# Patient Record
Sex: FEMALE | Race: WHITE | NOT HISPANIC OR LATINO | URBAN - METROPOLITAN AREA
[De-identification: names, ages, dates, MRNs, and addresses within clinical notes are randomized per-mention and may not be internally consistent; named-entity substitution may affect disease eponyms.]

---

## 2017-01-09 PROBLEM — Z00.00 ENCOUNTER FOR PREVENTIVE HEALTH EXAMINATION: Status: ACTIVE | Noted: 2017-01-09

## 2017-01-24 ENCOUNTER — OUTPATIENT (OUTPATIENT)
Dept: OUTPATIENT SERVICES | Facility: HOSPITAL | Age: 52
LOS: 1 days | End: 2017-01-24
Payer: COMMERCIAL

## 2017-01-24 ENCOUNTER — APPOINTMENT (OUTPATIENT)
Dept: ORTHOPEDIC SURGERY | Facility: CLINIC | Age: 52
End: 2017-01-24

## 2017-01-24 VITALS — BODY MASS INDEX: 30.18 KG/M2 | HEIGHT: 62 IN | WEIGHT: 164 LBS

## 2017-01-24 VITALS — SYSTOLIC BLOOD PRESSURE: 110 MMHG | DIASTOLIC BLOOD PRESSURE: 60 MMHG

## 2017-01-24 DIAGNOSIS — Z86.69 PERSONAL HISTORY OF OTHER DISEASES OF THE NERVOUS SYSTEM AND SENSE ORGANS: ICD-10-CM

## 2017-01-24 DIAGNOSIS — M54.41 LUMBAGO WITH SCIATICA, RIGHT SIDE: ICD-10-CM

## 2017-01-24 DIAGNOSIS — Z78.9 OTHER SPECIFIED HEALTH STATUS: ICD-10-CM

## 2017-01-24 DIAGNOSIS — G89.29 LUMBAGO WITH SCIATICA, RIGHT SIDE: ICD-10-CM

## 2017-01-24 DIAGNOSIS — Z82.62 FAMILY HISTORY OF OSTEOPOROSIS: ICD-10-CM

## 2017-01-24 DIAGNOSIS — M43.16 SPONDYLOLISTHESIS, LUMBAR REGION: ICD-10-CM

## 2017-01-24 DIAGNOSIS — Z87.39 PERSONAL HISTORY OF OTHER DISEASES OF THE MUSCULOSKELETAL SYSTEM AND CONNECTIVE TISSUE: ICD-10-CM

## 2017-01-24 DIAGNOSIS — Z86.39 PERSONAL HISTORY OF OTHER ENDOCRINE, NUTRITIONAL AND METABOLIC DISEASE: ICD-10-CM

## 2017-01-24 PROCEDURE — 72100 X-RAY EXAM L-S SPINE 2/3 VWS: CPT

## 2017-01-24 PROCEDURE — 72082 X-RAY EXAM ENTIRE SPI 2/3 VW: CPT | Mod: 26

## 2017-01-24 PROCEDURE — 72100 X-RAY EXAM L-S SPINE 2/3 VWS: CPT | Mod: 26,59

## 2017-01-24 PROCEDURE — 72082 X-RAY EXAM ENTIRE SPI 2/3 VW: CPT

## 2017-01-24 RX ORDER — ATORVASTATIN CALCIUM 80 MG/1
TABLET, FILM COATED ORAL
Refills: 0 | Status: ACTIVE | COMMUNITY

## 2017-01-30 PROBLEM — Z78.9 ALCOHOL INGESTION: Status: ACTIVE | Noted: 2017-01-30

## 2017-06-20 ENCOUNTER — RESULT REVIEW (OUTPATIENT)
Age: 52
End: 2017-06-20

## 2018-05-03 ENCOUNTER — TRANSCRIPTION ENCOUNTER (OUTPATIENT)
Age: 53
End: 2018-05-03

## 2018-07-16 ENCOUNTER — RESULT REVIEW (OUTPATIENT)
Age: 53
End: 2018-07-16

## 2018-08-14 ENCOUNTER — APPOINTMENT (OUTPATIENT)
Dept: BREAST CENTER | Facility: CLINIC | Age: 53
End: 2018-08-14
Payer: COMMERCIAL

## 2018-08-14 VITALS
HEART RATE: 60 BPM | DIASTOLIC BLOOD PRESSURE: 81 MMHG | HEIGHT: 62 IN | WEIGHT: 162 LBS | BODY MASS INDEX: 29.81 KG/M2 | SYSTOLIC BLOOD PRESSURE: 133 MMHG

## 2018-08-14 DIAGNOSIS — Z80.9 FAMILY HISTORY OF MALIGNANT NEOPLASM, UNSPECIFIED: ICD-10-CM

## 2018-08-14 PROCEDURE — 99214 OFFICE O/P EST MOD 30 MIN: CPT

## 2018-08-14 RX ORDER — BACILLUS COAGULANS/INULIN 1B-250 MG
CAPSULE ORAL
Refills: 0 | Status: ACTIVE | COMMUNITY

## 2019-01-15 ENCOUNTER — RX RENEWAL (OUTPATIENT)
Age: 54
End: 2019-01-15

## 2019-01-15 DIAGNOSIS — F41.9 ANXIETY DISORDER, UNSPECIFIED: ICD-10-CM

## 2019-02-26 ENCOUNTER — APPOINTMENT (OUTPATIENT)
Dept: BREAST CENTER | Facility: CLINIC | Age: 54
End: 2019-02-26
Payer: COMMERCIAL

## 2019-02-26 VITALS
DIASTOLIC BLOOD PRESSURE: 77 MMHG | SYSTOLIC BLOOD PRESSURE: 134 MMHG | HEIGHT: 62 IN | BODY MASS INDEX: 29.26 KG/M2 | HEART RATE: 81 BPM | WEIGHT: 159 LBS

## 2019-02-26 DIAGNOSIS — Z82.5 FAMILY HISTORY OF ASTHMA AND OTHER CHRONIC LOWER RESPIRATORY DISEASES: ICD-10-CM

## 2019-02-26 DIAGNOSIS — Z86.79 PERSONAL HISTORY OF OTHER DISEASES OF THE CIRCULATORY SYSTEM: ICD-10-CM

## 2019-02-26 DIAGNOSIS — Z87.19 PERSONAL HISTORY OF OTHER DISEASES OF THE DIGESTIVE SYSTEM: ICD-10-CM

## 2019-02-26 PROCEDURE — 99214 OFFICE O/P EST MOD 30 MIN: CPT

## 2019-02-26 RX ORDER — LEVOTHYROXINE SODIUM 137 UG/1
TABLET ORAL
Refills: 0 | Status: ACTIVE | COMMUNITY

## 2019-02-26 NOTE — PHYSICAL EXAM
[Normocephalic] : normocephalic [Atraumatic] : atraumatic [Supple] : supple [No Supraclavicular Adenopathy] : no supraclavicular adenopathy [No Cervical Adenopathy] : no cervical adenopathy [Normal Sinus Rhythm] : normal sinus rhythm [Examined in the supine and seated position] : examined in the supine and seated position [No dominant masses] : no dominant masses in right breast  [No dominant masses] : no dominant masses left breast [No Nipple Retraction] : no left nipple retraction [No Nipple Discharge] : no left nipple discharge [No Axillary Lymphadenopathy] : no left axillary lymphadenopathy [No Edema] : no edema [No Rashes] : no rashes [No Ulceration] : no ulceration [de-identified] : +marked diffuse thyromegaly > old

## 2019-04-10 ENCOUNTER — APPOINTMENT (OUTPATIENT)
Dept: PLASTIC SURGERY | Facility: CLINIC | Age: 54
End: 2019-04-10
Payer: COMMERCIAL

## 2019-04-10 PROCEDURE — 99201 OFFICE OUTPATIENT NEW 10 MINUTES: CPT | Mod: NC

## 2019-04-30 ENCOUNTER — APPOINTMENT (OUTPATIENT)
Dept: PLASTIC SURGERY | Facility: CLINIC | Age: 54
End: 2019-04-30
Payer: SELF-PAY

## 2019-04-30 PROCEDURE — 17999 UNLISTD PX SKN MUC MEMB SUBQ: CPT

## 2019-05-20 ENCOUNTER — TRANSCRIPTION ENCOUNTER (OUTPATIENT)
Age: 54
End: 2019-05-20

## 2019-08-02 ENCOUNTER — APPOINTMENT (OUTPATIENT)
Dept: PLASTIC SURGERY | Facility: CLINIC | Age: 54
End: 2019-08-02

## 2019-09-17 ENCOUNTER — APPOINTMENT (OUTPATIENT)
Dept: BREAST CENTER | Facility: CLINIC | Age: 54
End: 2019-09-17
Payer: COMMERCIAL

## 2019-09-17 VITALS
WEIGHT: 158 LBS | DIASTOLIC BLOOD PRESSURE: 78 MMHG | HEIGHT: 62 IN | SYSTOLIC BLOOD PRESSURE: 125 MMHG | BODY MASS INDEX: 29.08 KG/M2 | HEART RATE: 62 BPM

## 2019-09-17 PROCEDURE — 99214 OFFICE O/P EST MOD 30 MIN: CPT

## 2019-09-17 RX ORDER — LISINOPRIL 30 MG/1
TABLET ORAL
Refills: 0 | Status: DISCONTINUED | COMMUNITY
End: 2019-09-17

## 2019-09-17 RX ORDER — ELETRIPTAN HYDROBROMIDE 20 MG/1
20 TABLET, FILM COATED ORAL
Refills: 0 | Status: DISCONTINUED | COMMUNITY
End: 2019-09-17

## 2019-09-17 NOTE — PHYSICAL EXAM
[Normocephalic] : normocephalic [Atraumatic] : atraumatic [Supple] : supple [No Supraclavicular Adenopathy] : no supraclavicular adenopathy [No Cervical Adenopathy] : no cervical adenopathy [Normal Sinus Rhythm] : normal sinus rhythm [No Thyromegaly] : no thyromegaly [Examined in the supine and seated position] : examined in the supine and seated position [No dominant masses] : no dominant masses in right breast  [No dominant masses] : no dominant masses left breast [No Nipple Retraction] : no left nipple retraction [No Nipple Discharge] : no left nipple discharge [No Axillary Lymphadenopathy] : no left axillary lymphadenopathy [No Rashes] : no rashes [No Edema] : no edema [No Ulceration] : no ulceration

## 2019-09-17 NOTE — REVIEW OF SYSTEMS
[Recent Weight Loss (___ Lbs)] : recent [unfilled] ~Ulb weight loss [Heartburn] : heartburn [Constipation] : constipation [Negative] : Heme/Lymph

## 2019-09-17 NOTE — HISTORY OF PRESENT ILLNESS
[FreeTextEntry1] : S/P R Stereot Bx (7/23/09): +LCIS/ALH\par S/P R BR Bx (1997)([facility]): FA\par S/P R Br Bx w/NL (9/11/09): +ALH\par S/P tmx/ASAb x 5yrs\par +FH Br Ca (Mother 67 (BRCA-))\par +Ashkenazi ancestry\par BRCA (11/24/09): -\par S/P lap BSO (12/13)([facility]): Benign cyst\par No other MH/FH changes. ROS reviewed/discussed. Taking Ca/Vit D. Last Bone Densitometry (5/19/14): +osteopenia\par MRI (2/4/19): NSF\par Mammo/Sono (8/13/19): HD, NSF

## 2019-09-21 ENCOUNTER — TRANSCRIPTION ENCOUNTER (OUTPATIENT)
Age: 54
End: 2019-09-21

## 2019-10-16 ENCOUNTER — APPOINTMENT (OUTPATIENT)
Dept: PLASTIC SURGERY | Facility: CLINIC | Age: 54
End: 2019-10-16
Payer: SELF-PAY

## 2019-10-16 PROCEDURE — 17999 UNLISTD PX SKN MUC MEMB SUBQ: CPT | Mod: 78

## 2019-11-11 ENCOUNTER — RESULT REVIEW (OUTPATIENT)
Age: 54
End: 2019-11-11

## 2019-11-18 ENCOUNTER — APPOINTMENT (OUTPATIENT)
Dept: PLASTIC SURGERY | Facility: CLINIC | Age: 54
End: 2019-11-18
Payer: SELF-PAY

## 2019-11-18 PROCEDURE — 17999 UNLISTD PX SKN MUC MEMB SUBQ: CPT | Mod: 78

## 2019-12-30 ENCOUNTER — APPOINTMENT (OUTPATIENT)
Dept: PLASTIC SURGERY | Facility: CLINIC | Age: 54
End: 2019-12-30
Payer: SELF-PAY

## 2019-12-30 PROCEDURE — 17999 UNLISTD PX SKN MUC MEMB SUBQ: CPT | Mod: 78

## 2020-01-23 ENCOUNTER — TRANSCRIPTION ENCOUNTER (OUTPATIENT)
Age: 55
End: 2020-01-23

## 2020-01-24 ENCOUNTER — APPOINTMENT (OUTPATIENT)
Dept: PAIN MANAGEMENT | Facility: CLINIC | Age: 55
End: 2020-01-24
Payer: COMMERCIAL

## 2020-01-24 VITALS
BODY MASS INDEX: 27.97 KG/M2 | HEART RATE: 70 BPM | HEIGHT: 62 IN | WEIGHT: 152 LBS | SYSTOLIC BLOOD PRESSURE: 104 MMHG | DIASTOLIC BLOOD PRESSURE: 64 MMHG

## 2020-01-24 PROCEDURE — 99204 OFFICE O/P NEW MOD 45 MIN: CPT

## 2020-01-24 NOTE — PHYSICAL EXAM
[Person] : oriented to person [General Appearance - Alert] : alert [Place] : oriented to place [Time] : oriented to time [Sclera] : the sclera and conjunctiva were normal [Outer Ear] : the ears and nose were normal in appearance [Neck Appearance] : the appearance of the neck was normal [Nail Clubbing] : no clubbing  or cyanosis of the fingernails [] : no rash [Cranial Nerves Oculomotor (III)] : extraocular motion intact [Cranial Nerves Facial (VII)] : face symmetrical [Affect] : the affect was normal

## 2020-01-29 NOTE — HISTORY OF PRESENT ILLNESS
[FreeTextEntry1] : Patient reports headaches since adolescence not related to her menses at an intermittent frequency. She has been on Aimovig 70 mgs;140 mgs (made her constipated). for about one yearOver the past 3 months - often awaken with left side headache - ignore it but takes Advil / tylenol almost qd especially for back. Overall, frequency of headaches 50 % but some times no headaches. In general 4 tyl/advil 4 times per week \par Imitrex, Relpax caused angina\par In 2003, she fell and since then has noted left sided headaches. \par \par Surgical menopause age 48\par Dad has migraines

## 2020-01-29 NOTE — ASSESSMENT
[FreeTextEntry1] : Chronic migraine\par Non focal exam\par \par Consider BOTOX - Patient requests Emgality.\par Demonstrated kit shown\par Ubrelvy provided. Advised of AE.\par

## 2020-01-31 RX ORDER — ERENUMAB-AOOE 70 MG/ML
70 INJECTION SUBCUTANEOUS
Refills: 0 | Status: DISCONTINUED | COMMUNITY
End: 2020-01-31

## 2020-02-04 ENCOUNTER — APPOINTMENT (OUTPATIENT)
Dept: PLASTIC SURGERY | Facility: CLINIC | Age: 55
End: 2020-02-04
Payer: SELF-PAY

## 2020-02-04 PROCEDURE — 17999 UNLISTD PX SKN MUC MEMB SUBQ: CPT | Mod: 78

## 2020-02-05 ENCOUNTER — APPOINTMENT (OUTPATIENT)
Dept: PLASTIC SURGERY | Facility: CLINIC | Age: 55
End: 2020-02-05
Payer: SELF-PAY

## 2020-02-05 PROCEDURE — 11950 SUBQ NJX FILLING MATRL 1CC/<: CPT

## 2020-02-05 NOTE — ASSESSMENT
[FreeTextEntry1] : A:\par prominent glabellar frown lines\par P:\par Botox 20 units to glabellar frown lines\par well tolerated \par instructions reviewed

## 2020-02-05 NOTE — PROCEDURE
[FreeTextEntry1] : prominent glabellar frown lines [FreeTextEntry2] : Botox to glabellar frown lines  [FreeTextEntry3] : Topical Quadricaine  [FreeTextEntry4] : minimal  [FreeTextEntry5] : none  [FreeTextEntry6] : Following pre treatment with Quadricaine, 20 units Botox injected in the glabellar frown lines.  \par Patient tolerated well [FreeTextEntry7] : none

## 2020-02-05 NOTE — REASON FOR VISIT
[Procedure: _________] : a [unfilled] procedure visit [FreeTextEntry1] : Patient presents for treatment of prominent glabellar frown lines with Botox

## 2020-02-06 ENCOUNTER — TRANSCRIPTION ENCOUNTER (OUTPATIENT)
Age: 55
End: 2020-02-06

## 2020-02-12 ENCOUNTER — TRANSCRIPTION ENCOUNTER (OUTPATIENT)
Age: 55
End: 2020-02-12

## 2020-02-14 ENCOUNTER — TRANSCRIPTION ENCOUNTER (OUTPATIENT)
Age: 55
End: 2020-02-14

## 2020-03-23 ENCOUNTER — APPOINTMENT (OUTPATIENT)
Dept: BREAST CENTER | Facility: CLINIC | Age: 55
End: 2020-03-23

## 2020-03-30 ENCOUNTER — APPOINTMENT (OUTPATIENT)
Dept: PLASTIC SURGERY | Facility: CLINIC | Age: 55
End: 2020-03-30

## 2020-04-26 ENCOUNTER — MESSAGE (OUTPATIENT)
Age: 55
End: 2020-04-26

## 2020-05-01 ENCOUNTER — APPOINTMENT (OUTPATIENT)
Dept: DISASTER EMERGENCY | Facility: HOSPITAL | Age: 55
End: 2020-05-01

## 2020-05-03 LAB
SARS-COV-2 IGG SERPL IA-ACNC: 9.5 RATIO
SARS-COV-2 IGG SERPL QL IA: POSITIVE

## 2020-05-04 ENCOUNTER — APPOINTMENT (OUTPATIENT)
Dept: PAIN MANAGEMENT | Facility: CLINIC | Age: 55
End: 2020-05-04

## 2020-05-06 ENCOUNTER — TRANSCRIPTION ENCOUNTER (OUTPATIENT)
Age: 55
End: 2020-05-06

## 2020-05-21 ENCOUNTER — RX RENEWAL (OUTPATIENT)
Age: 55
End: 2020-05-21

## 2020-05-27 ENCOUNTER — TRANSCRIPTION ENCOUNTER (OUTPATIENT)
Age: 55
End: 2020-05-27

## 2020-06-09 ENCOUNTER — APPOINTMENT (OUTPATIENT)
Dept: BREAST CENTER | Facility: CLINIC | Age: 55
End: 2020-06-09
Payer: COMMERCIAL

## 2020-06-09 VITALS
SYSTOLIC BLOOD PRESSURE: 112 MMHG | WEIGHT: 147 LBS | BODY MASS INDEX: 27.05 KG/M2 | HEIGHT: 62 IN | DIASTOLIC BLOOD PRESSURE: 75 MMHG | HEART RATE: 86 BPM

## 2020-06-09 PROCEDURE — 99214 OFFICE O/P EST MOD 30 MIN: CPT

## 2020-06-09 RX ORDER — BUTALB/ACETAMINOPHEN/CAFFEINE 50-325-40
TABLET ORAL
Refills: 0 | Status: DISCONTINUED | COMMUNITY
End: 2020-06-09

## 2020-06-09 RX ORDER — GALCANEZUMAB 120 MG/ML
120 INJECTION, SOLUTION SUBCUTANEOUS
Qty: 2 | Refills: 0 | Status: DISCONTINUED | COMMUNITY
Start: 2020-01-31 | End: 2020-06-09

## 2020-06-09 RX ORDER — RANITIDINE HYDROCHLORIDE 300 MG/1
TABLET, FILM COATED ORAL
Refills: 0 | Status: DISCONTINUED | COMMUNITY
End: 2020-06-09

## 2020-06-09 NOTE — PHYSICAL EXAM
[Normocephalic] : normocephalic [Atraumatic] : atraumatic [Supple] : supple [No Supraclavicular Adenopathy] : no supraclavicular adenopathy [No Cervical Adenopathy] : no cervical adenopathy [Normal Sinus Rhythm] : normal sinus rhythm [Examined in the supine and seated position] : examined in the supine and seated position [No dominant masses] : no dominant masses in right breast  [No dominant masses] : no dominant masses left breast [No Nipple Retraction] : no left nipple retraction [No Nipple Discharge] : no left nipple discharge [No Axillary Lymphadenopathy] : no left axillary lymphadenopathy [No Edema] : no edema [No Rashes] : no rashes [No Ulceration] : no ulceration [de-identified] : +stable, diffusely enlarged gland

## 2020-06-12 ENCOUNTER — APPOINTMENT (OUTPATIENT)
Dept: PAIN MANAGEMENT | Facility: CLINIC | Age: 55
End: 2020-06-12
Payer: COMMERCIAL

## 2020-06-12 PROCEDURE — 99213 OFFICE O/P EST LOW 20 MIN: CPT | Mod: 95

## 2020-06-12 NOTE — REASON FOR VISIT
[Home] : at home, [unfilled] , at the time of the visit. [Medical Office: ([facility])___] : at the medical office located in  [Verbal consent obtained from patient] : the patient, [unfilled]

## 2020-06-12 NOTE — PHYSICAL EXAM
[General Appearance - Alert] : alert [Person] : oriented to person [Affect] : the affect was normal [Place] : oriented to place [Time] : oriented to time [Cranial Nerves Oculomotor (III)] : extraocular motion intact [Cranial Nerves Facial (VII)] : face symmetrical [Sclera] : the sclera and conjunctiva were normal [Outer Ear] : the ears and nose were normal in appearance [Neck Appearance] : the appearance of the neck was normal [] : no rash [Nail Clubbing] : no clubbing  or cyanosis of the fingernails

## 2020-06-14 NOTE — ASSESSMENT
[FreeTextEntry1] : Chronic migraine\par Non focal exam\par \par Will switch to Nurtec - advised of AEs \par Continue Emgality\par

## 2020-06-14 NOTE — HISTORY OF PRESENT ILLNESS
[FreeTextEntry1] : Patient reports Emgality helps to decrease freq and intensity of headache and Ubrelvy has somewhat worked. She reports being dx COVID and admitted to hospital. She reports a lack of smell since COVID. Since having COVID has had more headaches. Working.

## 2020-06-24 ENCOUNTER — TRANSCRIPTION ENCOUNTER (OUTPATIENT)
Age: 55
End: 2020-06-24

## 2020-06-25 ENCOUNTER — TRANSCRIPTION ENCOUNTER (OUTPATIENT)
Age: 55
End: 2020-06-25

## 2020-06-26 ENCOUNTER — TRANSCRIPTION ENCOUNTER (OUTPATIENT)
Age: 55
End: 2020-06-26

## 2020-06-29 ENCOUNTER — TRANSCRIPTION ENCOUNTER (OUTPATIENT)
Age: 55
End: 2020-06-29

## 2020-06-30 ENCOUNTER — TRANSCRIPTION ENCOUNTER (OUTPATIENT)
Age: 55
End: 2020-06-30

## 2020-07-15 ENCOUNTER — APPOINTMENT (OUTPATIENT)
Dept: PLASTIC SURGERY | Facility: CLINIC | Age: 55
End: 2020-07-15
Payer: SELF-PAY

## 2020-07-15 PROCEDURE — 17999 UNLISTD PX SKN MUC MEMB SUBQ: CPT

## 2020-07-15 NOTE — ASSESSMENT
[FreeTextEntry1] : A:\par Prominent glabellar frown lines\par P:\par Botox 20 units to Glabellar frown lines\par tolerated well\par Instructions reviewed

## 2020-07-15 NOTE — REASON FOR VISIT
[Procedure: _________] : a [unfilled] procedure visit [FreeTextEntry1] : Patient returns for treatment of glabellar frown lines with Botox

## 2020-07-15 NOTE — PROCEDURE
[FreeTextEntry1] : Prominent glabellar frown lines  [FreeTextEntry2] : Botox to glabellar frown lines  [FreeTextEntry3] : Topical Quadricaine [FreeTextEntry4] : minimal  [FreeTextEntry7] : none  [FreeTextEntry6] : FOllowing pre treatment with Quadricaine, 20 units Botox injected in the glabellar frown lines.  \par Patient tolerated well  [FreeTextEntry5] : none

## 2020-08-28 ENCOUNTER — APPOINTMENT (OUTPATIENT)
Dept: PLASTIC SURGERY | Facility: CLINIC | Age: 55
End: 2020-08-28
Payer: SELF-PAY

## 2020-08-28 PROCEDURE — 99212 OFFICE O/P EST SF 10 MIN: CPT | Mod: NC

## 2020-09-02 ENCOUNTER — APPOINTMENT (OUTPATIENT)
Dept: NEUROLOGY | Facility: CLINIC | Age: 55
End: 2020-09-02
Payer: COMMERCIAL

## 2020-09-02 VITALS
HEIGHT: 62 IN | BODY MASS INDEX: 27.42 KG/M2 | SYSTOLIC BLOOD PRESSURE: 117 MMHG | TEMPERATURE: 96.6 F | DIASTOLIC BLOOD PRESSURE: 74 MMHG | WEIGHT: 149 LBS | HEART RATE: 71 BPM

## 2020-09-02 DIAGNOSIS — R43.0 ANOSMIA: ICD-10-CM

## 2020-09-02 PROCEDURE — 99214 OFFICE O/P EST MOD 30 MIN: CPT

## 2020-09-02 PROCEDURE — 99204 OFFICE O/P NEW MOD 45 MIN: CPT

## 2020-09-02 NOTE — PHYSICAL EXAM
[FreeTextEntry1] : Physical examination \par General: No acute distress, Awake, Alert. \par Fundus: Unable\par Neck: no Carotid bruit. \par Cardiovascular: Normal rate, Regular rhythm, No murmur. \par Chest - clear bilaterally\par \par Mental status \par Awake, alert, and oriented to person, time and place, Normal attention span and concentration, Recent and remote memory intact, Language intact, Fund of knowledge intact. \par Cranial Nerves \par II: VFF \par III, IV, VI: PERRL, EOMI. \par V: Facial sensation is normal B/L. \par VII: Facial strength is normal B/L. \par VIII: Gross hearing is intact. \par IX, X: Palate is midline and elevates symmetrically. \par XI: Trapezius normal strength. \par XII: Tongue midline without atrophy or fasciculations. \par \par Motor exam \par Muscle tone - no evidence of rigidity or resistance in all 4 extremities. \par No atrophy or fasciculations \par Muscle Strength: arms and legs, proximal and distal flexors and extensors are normal \par No UE drift.\par \par Reflexes \par All present, normal, and symmetrical. \par Plantars right: mute. \par Plantars left: mute. \par Absent ankle jerks. \par \par Coordination \par Finger to nose: Normal. \par Heel to shin: Normal. \par \par Sensory \par Intact sensation to vibration and cold.\par \par Gait \par Normal\par \par Other\par Bilateral trapezius muscle spasm.\par Left occipital notch tenderness.

## 2020-09-02 NOTE — ASSESSMENT
[FreeTextEntry1] : Continue Emgality.\par Add Migrelief daily.\par Headache diary (Lori- Migraine Walter).\par Target water intake 64 ounces.\par \par Consider addition of Effexor or Botox at next visit.\par \par For muscle spasm- PT; also recommend TENS UNIT. She has Skelaxin at home - it has not been very helpful.

## 2020-09-02 NOTE — CONSULT LETTER
[Dear  ___] : Dear  [unfilled], [Consult Letter:] : I had the pleasure of evaluating your patient, [unfilled]. [Please see my note below.] : Please see my note below. [FreeTextEntry3] : Sincerely,\par \par Rosmery Ziegler M.D.\par

## 2020-09-02 NOTE — HISTORY OF PRESENT ILLNESS
[FreeTextEntry1] : Specific T5-year-old woman who is being seen in neurologic consultation for evaluation of headaches. Patient has had headaches since the age of 10. They used to be on her right side. Now they're mostly on her left side. They tend to be unilateral. They're associated with nausea but no vomiting. There is significant light sensitivity. They are very debilitating. She has to be in a dark room. She does not endorse any association with her menstrual cycle.\par Currently she is taking Emgality. She still has daily headaches. She avoids taking any medication because nothing seems to help. In the past she had tried Imitrex but had chest pain. She tried Relpax and could not tolerate it. Her primary care doctor at that time but she just had anxiety. She has tried Ubrelvy without consistent results. Then insurance did not cover it. Currently she has some Nurtec. She uses Tylenol or Advil but not daily despite having daily headaches.\par \par Most recently she had a headache which was associated with facial numbness. This resolves.\par She was diagnosed with COVID in March. Since then she has lost her sense of smell. She describes being very supersensitive to taste. Her headaches have intensified and become more chronic than for the last 6 months.\par \par She is also noticing a lot of tightness and spasm in her shoulders. She does not have significant neck pain with radiation.\par \par She does have anxiety and sees a therapist regularly and practices mediation.\par \par Preventative tried include Topamax which caused fatigue, Inderal which worsened her Raynaud symptoms, amitriptyline, Librium. She was placed on Aimovig which cause severe constipation.\par Medications not tried include Depakote, Lamictal, Effexor, Botox.

## 2020-09-16 ENCOUNTER — TRANSCRIPTION ENCOUNTER (OUTPATIENT)
Age: 55
End: 2020-09-16

## 2020-09-16 ENCOUNTER — APPOINTMENT (OUTPATIENT)
Dept: PLASTIC SURGERY | Facility: CLINIC | Age: 55
End: 2020-09-16
Payer: SELF-PAY

## 2020-09-16 PROCEDURE — 15789 CHEMICAL PEEL FACIAL DERMAL: CPT | Mod: 78

## 2020-09-23 ENCOUNTER — TRANSCRIPTION ENCOUNTER (OUTPATIENT)
Age: 55
End: 2020-09-23

## 2020-10-19 ENCOUNTER — APPOINTMENT (OUTPATIENT)
Dept: PLASTIC SURGERY | Facility: CLINIC | Age: 55
End: 2020-10-19

## 2020-11-17 ENCOUNTER — APPOINTMENT (OUTPATIENT)
Dept: PLASTIC SURGERY | Facility: CLINIC | Age: 55
End: 2020-11-17
Payer: COMMERCIAL

## 2020-11-17 ENCOUNTER — RESULT REVIEW (OUTPATIENT)
Age: 55
End: 2020-11-17

## 2020-11-17 PROCEDURE — 15789 CHEMICAL PEEL FACIAL DERMAL: CPT | Mod: 78

## 2020-11-18 ENCOUNTER — APPOINTMENT (OUTPATIENT)
Dept: NEUROLOGY | Facility: CLINIC | Age: 55
End: 2020-11-18
Payer: COMMERCIAL

## 2020-11-18 VITALS
DIASTOLIC BLOOD PRESSURE: 80 MMHG | HEART RATE: 73 BPM | BODY MASS INDEX: 27.6 KG/M2 | TEMPERATURE: 97.2 F | HEIGHT: 62 IN | WEIGHT: 150 LBS | SYSTOLIC BLOOD PRESSURE: 123 MMHG

## 2020-11-18 PROCEDURE — 99214 OFFICE O/P EST MOD 30 MIN: CPT

## 2020-11-18 RX ORDER — METHYLPREDNISOLONE 32 MG/1
32 TABLET ORAL
Qty: 2 | Refills: 0 | Status: DISCONTINUED | COMMUNITY
Start: 2019-01-15 | End: 2020-11-18

## 2020-11-18 NOTE — ASSESSMENT
[FreeTextEntry1] : Continue Emgality.\par Continue Migrelief daily.\par Headache diary (Lori- Migraine Walter). Encouraged to use.\par Target water intake 64 ounces.\par \par Will add Gabapentin to help with vasomotor symptoms and remaining headaches.\par Encouraged to use TENS unit.\par

## 2020-11-18 NOTE — PHYSICAL EXAM
[FreeTextEntry1] : Physical examination \par General: No acute distress, Awake, Alert. \par Cardiovascular: Normal rate, Regular rhythm, No murmur. \par Chest - clear bilaterally\par \par Mental status \par Awake, alert, and oriented to person, time and place, Normal attention span and concentration, Recent and remote memory intact, Language intact, Fund of knowledge intact. \par Cranial Nerves \par II: VFF \par III, IV, VI: PERRL, EOMI. \par V: Facial sensation is normal B/L. \par VII: Facial strength is normal B/L. \par VIII: Gross hearing is intact. \par IX, X: Palate is midline and elevates symmetrically. \par XI: Trapezius normal strength. \par XII: Tongue midline without atrophy or fasciculations. \par \par Motor exam \par Muscle tone - no evidence of rigidity or resistance in all 4 extremities. \par No atrophy or fasciculations \par Muscle Strength: arms and legs, proximal and distal flexors and extensors are normal \par No UE drift.\par \par Coordination \par Finger to nose: Normal. \par Heel to shin: Normal. \par \par \par Gait \par Normal\par \par Other\par Bilateral trapezius muscle spasm.\par Left occipital notch tenderness.

## 2020-11-18 NOTE — HISTORY OF PRESENT ILLNESS
[FreeTextEntry1] : 11/1812020\par Patient notes that her headaches actually were doing really well. The last 10 days she has noticed increased number of headaches. They're not severe. In fact she is not even taking any medication regularly her daily for this. She notes that Nurtec helps.\par When she has a severe trapezius muscle spasm she notes sometimes that can trigger her headaches.\par She she has a lot of hot flashes and vasomotor symptoms at night especially. These to prevent her from sleeping well.\par Finished PT.\par MRI BRAIN shows chronic ischemic changes. \par \par 9/2/2020\par This  is a 55-year-old woman who is being seen in neurologic consultation for evaluation of headaches. Patient has had headaches since the age of 10. They used to be on her right side. Now they're mostly on her left side. They tend to be unilateral. They're associated with nausea but no vomiting. There is significant light sensitivity. They are very debilitating. She has to be in a dark room. She does not endorse any association with her menstrual cycle.\par Currently she is taking Emgality. She still has daily headaches. She avoids taking any medication because nothing seems to help. In the past she had tried Imitrex but had chest pain. She tried Relpax and could not tolerate it. Her primary care doctor at that time but she just had anxiety. She has tried Ubrelvy without consistent results. Then insurance did not cover it. Currently she has some Nurtec. She uses Tylenol or Advil but not daily despite having daily headaches.\par \par Most recently she had a headache which was associated with facial numbness. This resolves.\par She was diagnosed with COVID in March. Since then she has lost her sense of smell. She describes being very supersensitive to taste. Her headaches have intensified and become more chronic than for the last 6 months.\par \par She is also noticing a lot of tightness and spasm in her shoulders. She does not have significant neck pain with radiation.\par \par She does have anxiety and sees a therapist regularly and practices mediation.\par \par Preventative tried include Topamax which caused fatigue, Inderal which worsened her Raynaud symptoms, amitriptyline, Librium. She was placed on Aimovig which cause severe constipation.\par Medications not tried include Depakote, Lamictal, Effexor, Botox.

## 2021-01-20 ENCOUNTER — APPOINTMENT (OUTPATIENT)
Dept: PLASTIC SURGERY | Facility: CLINIC | Age: 56
End: 2021-01-20
Payer: SELF-PAY

## 2021-01-20 VITALS
WEIGHT: 152 LBS | SYSTOLIC BLOOD PRESSURE: 148 MMHG | HEIGHT: 62 IN | BODY MASS INDEX: 27.97 KG/M2 | HEART RATE: 66 BPM | DIASTOLIC BLOOD PRESSURE: 79 MMHG

## 2021-01-20 PROCEDURE — 11950 SUBQ NJX FILLING MATRL 1CC/<: CPT | Mod: 78

## 2021-01-20 NOTE — PROCEDURE
[FreeTextEntry1] : prominent glabellar frown lines  [FreeTextEntry2] : Botox to glabella  [FreeTextEntry3] : Topical Quadricaine  [FreeTextEntry4] : minimal  [FreeTextEntry5] : none  [FreeTextEntry6] : Following pre treatment with Quadricaine, 20 units Botox injected in the glabellar frown lines.  \par Patient tolerated well \par \par  [FreeTextEntry7] : none

## 2021-01-20 NOTE — REASON FOR VISIT
[Procedure: _________] : a [unfilled] procedure visit [FreeTextEntry1] : Patient returns for treatment of prominent glabellar frown lines

## 2021-01-20 NOTE — ASSESSMENT
[FreeTextEntry1] : A:Prominent glabellar frown lines \par P:\par 20 units Botox injected in the glabellar frown lines.  \par Patient tolerated well \par Instructions reviewed \par

## 2021-01-21 ENCOUNTER — APPOINTMENT (OUTPATIENT)
Dept: BREAST CENTER | Facility: CLINIC | Age: 56
End: 2021-01-21
Payer: COMMERCIAL

## 2021-01-21 VITALS
DIASTOLIC BLOOD PRESSURE: 82 MMHG | WEIGHT: 152 LBS | SYSTOLIC BLOOD PRESSURE: 135 MMHG | HEIGHT: 62 IN | BODY MASS INDEX: 27.97 KG/M2 | HEART RATE: 62 BPM

## 2021-01-21 DIAGNOSIS — Z12.31 ENCOUNTER FOR SCREENING MAMMOGRAM FOR MALIGNANT NEOPLASM OF BREAST: ICD-10-CM

## 2021-01-21 PROCEDURE — 99072 ADDL SUPL MATRL&STAF TM PHE: CPT

## 2021-01-21 PROCEDURE — 99214 OFFICE O/P EST MOD 30 MIN: CPT

## 2021-01-21 NOTE — HISTORY OF PRESENT ILLNESS
[FreeTextEntry1] : S/P R Stereot Bx (7/23/09): +LCIS/ALH\par S/P R BR Bx (1997)([facility]): FA\par S/P R Br Bx w/NL (9/11/09): +ALH\par S/P tmx/ASAb x 5yrs\par +FH Br Ca (Mother 67 (BRCA-))\par +Ashkenazi ancestry\par BRCA (11/24/09): -\par S/P lap BSO (12/13)([facility]): Benign cyst\par Pt had Covid19 w/ sig sx's req brief hosp > now recovering. Ab+\par No other MH/FH changes. ROS reviewed/discussed. Taking Ca/Vit D. Last Bone Densitometry (5/19/14): +osteopenia\par MRI (2/4/19): NSF\par Mammo/Sono (8/12/20): HD, [facility]

## 2021-01-21 NOTE — PHYSICAL EXAM
[Normocephalic] : normocephalic [Atraumatic] : atraumatic [Supple] : supple [No Supraclavicular Adenopathy] : no supraclavicular adenopathy [No Cervical Adenopathy] : no cervical adenopathy [Normal Sinus Rhythm] : normal sinus rhythm [Examined in the supine and seated position] : examined in the supine and seated position [No dominant masses] : no dominant masses in right breast  [No dominant masses] : no dominant masses left breast [No Nipple Retraction] : no left nipple retraction [No Nipple Discharge] : no left nipple discharge [No Axillary Lymphadenopathy] : no left axillary lymphadenopathy [No Edema] : no edema [No Rashes] : no rashes [No Ulceration] : no ulceration [de-identified] : +thyromegaly

## 2021-01-21 NOTE — REVIEW OF SYSTEMS
[Arthralgias] : arthralgias [Joint Pain] : joint pain [Hot Flashes] : hot flashes [Negative] : Heme/Lymph [FreeTextEntry2] : Night sweat/hot flashes [FreeTextEntry7] : GERD [de-identified] : Raynaud finger and toes  [de-identified] : Thyroid issues

## 2021-01-26 ENCOUNTER — TRANSCRIPTION ENCOUNTER (OUTPATIENT)
Age: 56
End: 2021-01-26

## 2021-02-02 ENCOUNTER — TRANSCRIPTION ENCOUNTER (OUTPATIENT)
Age: 56
End: 2021-02-02

## 2021-02-03 ENCOUNTER — APPOINTMENT (OUTPATIENT)
Dept: PLASTIC SURGERY | Facility: CLINIC | Age: 56
End: 2021-02-03
Payer: SELF-PAY

## 2021-02-03 ENCOUNTER — TRANSCRIPTION ENCOUNTER (OUTPATIENT)
Age: 56
End: 2021-02-03

## 2021-02-03 PROCEDURE — 15788 CHEMICAL PEEL FACIAL EPIDRM: CPT | Mod: 78

## 2021-02-08 ENCOUNTER — TRANSCRIPTION ENCOUNTER (OUTPATIENT)
Age: 56
End: 2021-02-08

## 2021-03-16 ENCOUNTER — RX RENEWAL (OUTPATIENT)
Age: 56
End: 2021-03-16

## 2021-03-17 ENCOUNTER — APPOINTMENT (OUTPATIENT)
Dept: NEUROLOGY | Facility: CLINIC | Age: 56
End: 2021-03-17
Payer: COMMERCIAL

## 2021-03-17 VITALS
HEIGHT: 62 IN | TEMPERATURE: 97.8 F | HEART RATE: 78 BPM | WEIGHT: 152 LBS | OXYGEN SATURATION: 100 % | BODY MASS INDEX: 27.97 KG/M2

## 2021-03-17 PROCEDURE — 99072 ADDL SUPL MATRL&STAF TM PHE: CPT

## 2021-03-17 PROCEDURE — 99213 OFFICE O/P EST LOW 20 MIN: CPT

## 2021-03-17 RX ORDER — UBROGEPANT 50 MG/1
50 TABLET ORAL
Qty: 30 | Refills: 0 | Status: DISCONTINUED | COMMUNITY
Start: 2020-01-24 | End: 2021-03-17

## 2021-03-17 RX ORDER — GABAPENTIN 100 MG/1
100 CAPSULE ORAL
Qty: 90 | Refills: 2 | Status: DISCONTINUED | COMMUNITY
Start: 2020-11-18 | End: 2021-03-17

## 2021-03-18 NOTE — ASSESSMENT
[FreeTextEntry1] : Continue Emgality.\par \par Nurtec prn +/- Ibuprofen.\par If headaches persist after 6 hours or at night, consider Benadryl 25 mg.\par Don't mix Indomethacin with Ibuprofen.\par \par (Instead of Indomethacin (Naproxen does not work)- Ibuprofen works.)\par \par f/u in 6 months.

## 2021-03-18 NOTE — HISTORY OF PRESENT ILLNESS
[FreeTextEntry1] : 2-4 migraines per month\par didn't take gabapentin because friends didn't tolerate it.\par PT finished, accupuncture and pilates helps too.\par \par indomethacin makes her dizzy and nauseous.\par Tolerating Emgality.

## 2021-05-26 ENCOUNTER — APPOINTMENT (OUTPATIENT)
Dept: PLASTIC SURGERY | Facility: CLINIC | Age: 56
End: 2021-05-26
Payer: SELF-PAY

## 2021-05-26 PROCEDURE — 11950 SUBQ NJX FILLING MATRL 1CC/<: CPT

## 2021-05-26 NOTE — ASSESSMENT
[FreeTextEntry1] : A:\par Prominent glabellar frown lines \par P:\par Botox to glabellar frown lines \par Patient tolerated well\par Instructions reviewed

## 2021-05-26 NOTE — REASON FOR VISIT
[Procedure: _________] : a [unfilled] procedure visit [FreeTextEntry1] : Patient returns for Botox to glabellar frown lines

## 2021-05-26 NOTE — PROCEDURE
[FreeTextEntry1] : Prominent glabellar frown lines  [FreeTextEntry2] : Botox to glabellar frown lines  [FreeTextEntry3] : Topical Quadricaine  [FreeTextEntry4] : minimal  [FreeTextEntry5] : none  [FreeTextEntry6] : Following pre treatment with Quadricaine, 20 units Botox injected in the glabellar frown lines.  \par Patient tolerated well \par Instructions reviewed  [FreeTextEntry7] : none

## 2021-06-16 ENCOUNTER — TRANSCRIPTION ENCOUNTER (OUTPATIENT)
Age: 56
End: 2021-06-16

## 2021-06-25 ENCOUNTER — NON-APPOINTMENT (OUTPATIENT)
Age: 56
End: 2021-06-25

## 2021-07-06 ENCOUNTER — TRANSCRIPTION ENCOUNTER (OUTPATIENT)
Age: 56
End: 2021-07-06

## 2021-10-06 ENCOUNTER — APPOINTMENT (OUTPATIENT)
Dept: NEUROLOGY | Facility: CLINIC | Age: 56
End: 2021-10-06
Payer: COMMERCIAL

## 2021-10-06 VITALS
HEART RATE: 63 BPM | OXYGEN SATURATION: 100 % | BODY MASS INDEX: 28.71 KG/M2 | WEIGHT: 156 LBS | HEIGHT: 62 IN | DIASTOLIC BLOOD PRESSURE: 80 MMHG | SYSTOLIC BLOOD PRESSURE: 128 MMHG

## 2021-10-06 DIAGNOSIS — M62.838 OTHER MUSCLE SPASM: ICD-10-CM

## 2021-10-06 PROCEDURE — 99214 OFFICE O/P EST MOD 30 MIN: CPT

## 2021-10-06 RX ORDER — DIAZEPAM 5 MG/1
5 TABLET ORAL
Qty: 3 | Refills: 0 | Status: DISCONTINUED | COMMUNITY
Start: 2019-01-15 | End: 2021-10-06

## 2021-10-06 RX ORDER — METOCLOPRAMIDE 10 MG/1
10 TABLET ORAL TWICE DAILY
Qty: 20 | Refills: 0 | Status: DISCONTINUED | COMMUNITY
Start: 2021-06-25 | End: 2021-10-06

## 2021-10-06 RX ORDER — INDOMETHACIN 50 MG/1
50 CAPSULE ORAL TWICE DAILY
Qty: 60 | Refills: 0 | Status: DISCONTINUED | COMMUNITY
Start: 2021-01-26 | End: 2021-10-06

## 2021-10-06 NOTE — PHYSICAL EXAM
[FreeTextEntry1] : Physical examination \par General: No acute distress, Awake, Alert. \par Cardiovascular: Normal rate, Regular rhythm, No murmur. \par Chest - clear bilaterally\par \par Mental status \par Awake, alert, and oriented to person, time and place, Normal attention span and concentration, Recent and remote memory intact, Language intact, Fund of knowledge intact. \par Cranial Nerves \par II: VFF \par III, IV, VI: PERRL, EOMI. \par V: Facial sensation is normal B/L. \par VII: Facial strength is normal B/L. \par VIII: Gross hearing is intact. \par IX, X: Palate is midline and elevates symmetrically. \par XI: Trapezius normal strength. \par XII: Tongue midline without atrophy or fasciculations. \par \par Motor exam \par Muscle tone - no evidence of rigidity or resistance in all 4 extremities. \par No atrophy or fasciculations \par Muscle Strength: arms and legs, proximal and distal flexors and extensors are normal \par No UE drift.\par \par Coordination \par Finger to nose: Normal. \par Heel to shin: Normal. \par \par Gait \par Normal\par \par Left trapezius muscle spasm.

## 2021-10-06 NOTE — HISTORY OF PRESENT ILLNESS
[FreeTextEntry1] : 10/5/21\par Left sided retro-orbital headache, very mild- if she didn't take anything she would be okay\par \par Nurtec works better than Ubrelvy.\par Once or twice a month  she needs Nurtec\par \par Ketorolac helps with her back pain and headaches\par \par Notes Emgality works well overall.\par \par Did PT for neck pain and spasm\par pinky numb\par + neck pain, +elbow pain\par Fell down a flight of stairs in 2003\par No loss of strength\par \par 3/17/21\par 2-4 migraines per month\par didn't take gabapentin because friends didn't tolerate it.\par PT finished, accupuncture and pilates helps too.\par \par indomethacin makes her dizzy and nauseous.\par Tolerating Emgality.

## 2021-10-06 NOTE — ASSESSMENT
[FreeTextEntry1] : Continue Emgality.\par \par Nurtec prn +/- Ibuprofen.\par If headaches persist after 6 hours or at night, consider Benadryl 25 mg.\par Don't mix Indomethacin with Ibuprofen.\par \par She has already tried conservative measures for neck pain.\par I am concerned about a radiculopathy.\par MRI cervical spine and pain management consultation.\par \par May take Skelaxin twice daily PRN spasm.

## 2021-10-17 ENCOUNTER — TRANSCRIPTION ENCOUNTER (OUTPATIENT)
Age: 56
End: 2021-10-17

## 2021-10-20 ENCOUNTER — APPOINTMENT (OUTPATIENT)
Dept: PLASTIC SURGERY | Facility: CLINIC | Age: 56
End: 2021-10-20
Payer: SELF-PAY

## 2021-10-20 ENCOUNTER — APPOINTMENT (OUTPATIENT)
Dept: PAIN MANAGEMENT | Facility: HOSPITAL | Age: 56
End: 2021-10-20
Payer: COMMERCIAL

## 2021-10-20 DIAGNOSIS — M25.531 PAIN IN RIGHT WRIST: ICD-10-CM

## 2021-10-20 PROCEDURE — 99212 OFFICE O/P EST SF 10 MIN: CPT | Mod: NC

## 2021-10-20 PROCEDURE — 99204 OFFICE O/P NEW MOD 45 MIN: CPT

## 2021-10-28 ENCOUNTER — APPOINTMENT (OUTPATIENT)
Dept: PLASTIC SURGERY | Facility: CLINIC | Age: 56
End: 2021-10-28
Payer: SELF-PAY

## 2021-10-28 PROCEDURE — 64612 DESTROY NERVE FACE MUSCLE: CPT

## 2021-10-28 NOTE — PROCEDURE
[FreeTextEntry1] : Prominent glabellar frown lines  [FreeTextEntry2] : Botox to glabella  [FreeTextEntry3] : Topical Quadricaine  [FreeTextEntry4] : minimal  [FreeTextEntry5] : none [FreeTextEntry6] : Following pre treatment with Quadricaine, 20 units Botox injected in the glabellar area.\par Patient tolerated well.  \par \par \par \par  [FreeTextEntry7] : none

## 2021-10-28 NOTE — REASON FOR VISIT
[Procedure: _________] : a [unfilled] procedure visit [FreeTextEntry1] : Patient returns for treatment of the glabellar area with Botox

## 2021-10-28 NOTE — DATA REVIEWED
[FreeTextEntry1] : PROCEDURE: MRI CERVICAL SPINE\par 9/2021 \par  \par \par HISTORY: 56 years Female M54.12 MRI \par \par  PROCEDURE: MRI cervical spine without contrast \par \par  COMPARISON: None \par \par  TECHNIQUE: MRI cervical spine performed 1.5 Shefali magnet \par \par  FINDINGS: \par  There is maintenance of the normal cervical lordosis. \par \par  The prevertebral soft tissues appear within range of normal. \par \par  The craniocervical junction and clivus and C1-C2 distance appear within range \par  of normal \par \par  There is uniform marrow signal on all sequences \par \par  The cervical cord is uniform in signal intensity without evidence of syrinx or \par  suggestion of myelomalacia. \par \par  At C2-C3 , there is no significant central or foraminal stenosis. \par \par  At C3-C4 there is no significant central or foraminal stenosis. \par \par  At C4-C5, there is a small midline disc protrusion indenting the thecal sac \par  without significant cord deformity. A tiny cranially extruded disc fragment \par  may be present best seen on sagittal image 8 series 4. \par \par  At C5-C6 , there is broad-based disc osteophyte complex and bilateral \par  uncovertebral joint spurring which contributes to multifactorial central canal \par  stenosis reducing the AP dimension of the thecal sac to 7.5 mm and mild to \par  moderate bilateral foraminal narrowing. \par \par  At C6-C7 there is broad-based disc osteophyte complex and bilateral \par  uncovertebral joint spurring. Central disc bulge reduces the AP dimension of \par  the thecal sac to 9 mm. There is mild bilateral foraminal stenosis. \par \par  At C7-T1 , there is a shallow central disc bulge unchanged from prior exam. \par \par  At T1-T2 there is a central disc protrusion indenting the thecal sac without \par  cord deformity \par \par  IMPRESSION: \par  Multilevel degenerative disc disease as outlined above contributes to mild \par  central canal stenosis at C5-C6 and C6-C7. \par \par  Tiny midline disc protrusion with minimal cranial extrusion at C4-C5 \par \par  Other findings outlined above \par \par  Other findings discussed above \par

## 2021-10-28 NOTE — PHYSICAL EXAM
[de-identified] : General: Well-developed and well-nourished.  No acute distress.\par Psychiatric: Behavior was cooperative\par Head:  Normocephalic and atraumatic\par Eyes:  Sclera white. Conjunctiva and eyelids pink and moist without discharge.\par Cardiovascular:  Regular\par Respiratory:  Trachea midline. Normal effort.\par No accessory muscle use with respiration\par Abdomen: Non distended, soft and nontender\par Skin:  No rashes, ulcers, or lesions appreciated.\par Neck: Some pain with extension,   normal ROM, negative spurlings\par Extremities: No edema \par Musculoskeletal: Moving all extremities freely\par Neuro: CN 2-12 Grossly intact\par Sensation to light touch is intact all extremities\par Gait: Ambulates without assistive device.

## 2021-10-28 NOTE — ASSESSMENT
[FreeTextEntry1] : A:\par Facial rhytids\par P:\par Botox 20 units injected \par Patient tolerated well \par Instructions reviewed \par \par \par

## 2021-10-28 NOTE — HISTORY OF PRESENT ILLNESS
[Neck Pain] : neck pain [Hand Numbness] : hand numbness [Episodic] : episodic [4] : an average pain level of 4/10 [Electric] : electric [Turning Head] : turning head [Laying] : laying [FreeTextEntry1] : HPI\par \par Ms. MARJ OLSON is a 56 year F with PHx as below, referred by FRIEND who presents today with chief complaint of neck and bilateral upper extremity discomfort. Reports that it developed following a fall in 2003. It is located most prominently in the LEFT Cervical Spine;  there is radiation of the pain into the 5th digit left hand. The pain is presently 5/10 in severity on the numerical rating scale. It is tingling in nature. The pain is intermittent. There is diurnal worsening, during the night. The pain is aggravated with certain positions, rotation. The pain improves with rest, ibuprofen. The pain does impair the patient’s ability to sleep. \par \par Patient has no been seen by pain management in the past. \par Seeing Dr Castillo for chronic migraine ( on emgality and nurtec)\par Patient has trialed Physical therapy, skelaxin with mild relief\par  \par Reports there is present numbness, there is no weakness. Patient denies any bowel/bladder incontinence, no saddle/perineal anesthesia or any other red flag signs or symptoms. Reports regular BMs.\par

## 2021-10-28 NOTE — ASSESSMENT
[FreeTextEntry1] : Ms. MARJ OLSON is a 56 year F suffering from neck and left upper pain, that based upon today's subjective complaints, physical examination, and MRI imaging review, is likely secondary to cervical radiculopathy\par \par >> Medications\par \par Chronic opioid use for non-malignant pain, in particular at high doses would not be recommended since it can potentially lead to hyperalgesia (hypersensitivity), tolerance and addiction. \par  \par Pregabalin 75 mg po BID\par  \par Cw Ibuprofen, skelaxin as needed\par \par >> Interventions\par  \par Consider for LEFT C7/T1 interlaminar epidural steroid injection if pain progresses, fails to respond to conservative measures. (Regular use of NSAID noted)\par  \par >> Therapy and Other Modalities\par  \par PT 2x weekly 4 weeks\par \par Continue with daily home stretching regimen  \par \par >> Imaging and Other Studies\par \par Xray right wrist eval for oa given tenderness at distal radius on exam\par \par I have personally reviewed the images in detail together with the patient today, and I have answered all questions regarding this condition to the best of my ability, to the patient's satisfaction. \par .image \par  \par \par >> Consults\par \par None ordered

## 2021-11-03 ENCOUNTER — APPOINTMENT (OUTPATIENT)
Dept: PAIN MANAGEMENT | Facility: HOSPITAL | Age: 56
End: 2021-11-03

## 2021-12-03 ENCOUNTER — APPOINTMENT (OUTPATIENT)
Dept: PLASTIC SURGERY | Facility: CLINIC | Age: 56
End: 2021-12-03
Payer: SELF-PAY

## 2021-12-03 PROCEDURE — 15789 CHEMICAL PEEL FACIAL DERMAL: CPT | Mod: 78

## 2021-12-22 ENCOUNTER — APPOINTMENT (OUTPATIENT)
Dept: PAIN MANAGEMENT | Facility: CLINIC | Age: 56
End: 2021-12-22
Payer: COMMERCIAL

## 2021-12-22 VITALS
OXYGEN SATURATION: 98 % | RESPIRATION RATE: 16 BRPM | SYSTOLIC BLOOD PRESSURE: 110 MMHG | HEART RATE: 62 BPM | TEMPERATURE: 98.2 F | DIASTOLIC BLOOD PRESSURE: 78 MMHG

## 2021-12-22 PROCEDURE — 99214 OFFICE O/P EST MOD 30 MIN: CPT

## 2021-12-22 NOTE — ASSESSMENT
[FreeTextEntry1] : Ms. MARJ OLSON is a 56 year F suffering from neck and left upper pain, that based upon today's subjective complaints, physical examination, and MRI imaging review, is likely secondary to cervical radiculopathy\par \par >> Medications\par \par Chronic opioid use for non-malignant pain, in particular at high doses would not be recommended since it can potentially lead to hyperalgesia (hypersensitivity), tolerance and addiction. \par  \par  \par Cw Ibuprofen, skelaxin as needed\par \par >> Interventions\par \par  Arrange for LEFT L4 and L5 transforaminal epidural steroid injection under fluoroscopic guidance. Success rate and possible complications discussed with patient in detail. \par  \par >> Therapy and Other Modalities\par  \par \par Continue with daily home stretching regimen  \par \par >> Imaging and Other Studies\par  \par \par I have personally reviewed the images in detail together with the patient today, and I have answered all questions regarding this condition to the best of my ability, to the patient's satisfaction. \par .image \par  \par \par >> Consults\par \par None ordered

## 2021-12-22 NOTE — PHYSICAL EXAM
[de-identified] : General: Well-developed and well-nourished.  No acute distress.\par Psychiatric: Behavior was cooperative\par Head:  Normocephalic and atraumatic\par Eyes:  Sclera white. Conjunctiva and eyelids pink and moist without discharge.\par Cardiovascular:  Regular\par Respiratory:  Trachea midline. Normal effort.\par No accessory muscle use with respiration\par Abdomen: Non distended, soft and nontender\par Skin:  No rashes, ulcers, or lesions appreciated.\par BACK Some pain with extension,   normal ROM\par SLR LEFT positive\par Extremities: No edema \par Musculoskeletal: Moving all extremities freely\par Neuro: CN 2-12 Grossly intact\par Sensation to light touch is intact all extremities\par Gait: Ambulates without assistive device.

## 2021-12-22 NOTE — DATA REVIEWED
[FreeTextEntry1] : ORDER #: [id number] \par CC: ; ; ; \par End of cc's \par \par HISTORY: 56 years Female [id number] MRI \par \par \par  PROCEDURE:MRI lumbar spine without contrast \par \par  COMPARISON:August 12, 2016 \par \par  TECHNIQUE:MRI lumbosacral spine 1.5 Shefali magnet \par \par  FINDINGS: \par \par  The paraspinal musculature including the psoas muscle, multifidus muscles, and \par  immediate para axial soft tissues appear within range of normal without \par  evidence of mass or collection. \par \par  There is maintenance of a lumbar lordosis. Anterior subluxation of L4 with \par  respect to L5 is grade 1 in severity but has progressed since the 2016 study \par  now measuring up to 4 mm. \par \par  There is fairly uniform marrow signal on all sequences. \par \par  The spinal canal is patent without detectable intradural or extradural mass or \par  collection. \par \par  At L5-S1 \par  There is loss of intervertebral disc space height. No focal disc protrusion is \par  evident. \par \par  At L4-L5 \par  Anterior subluxation of L4 with respect to L5 results in uncovering of the \par  disc and resultant circumferential disc bulging. There is multifactorial \par  moderately severe central canal stenosis present partially secondary to \par  uncovering of the intervertebral disc and anterior subluxation of L4 and \par  partially secondary to bony overgrowth of the facet joints and thickening of \par  the ligamentum flavum. There is effacement of the lateral recesses. Mild \par  bilateral foraminal narrowing is present. \par \par  At L3-L4 \par  There is loss of intervertebral disc space height and circumferential disc \par  bulging which contributes to multifactorial mild central canal stenosis and \par  effacing the lateral recesses on the left more so than the right. No \par  significant foraminal stenosis is present despite a small left foraminal disc \par  protrusion at L3-L4. \par \par  At L2-L3 \par  There is loss of intervertebral disc space height and circumferential disc \par  bulging which has progressed significantly since the prior exam. Effacement of \par  the lateral recesses is noted. Bilateral intraforaminal disc protrusion are \par  present which to not impinge upon the exiting nerve roots \par \par  At L1-L2 \par  The disc appears intact. There is no significant central or foraminal \par  stenosis. \par \par \par \par  IMPRESSION: \par \par  Progressive degenerative changes of the lumbar spine as outlined above include \par  but are not limited to progressive anterior subluxation of L4 with respect to \par  L5 and associated multifactorial central canal stenosis moderately severe in \par  degree. \par \par  Other pertinent findings described above also demonstrated progressive \par  degenerative disc disease as compared to the August 12, 2016 study

## 2021-12-22 NOTE — HISTORY OF PRESENT ILLNESS
[FreeTextEntry1] : Interval Note:\par \par Since last visit reports new pain in back going down LEFT leg, hamstrings occasionally into the calf\par \par Completed MRI - saw Dr Rosales spine surgeon who recommended conservative care, PT which has been helping to a mild degree, however pain persists. \par \par Moving bowels regularly. No recent falls. \par \par Denies weakness, numbness. Patient denies any bowel/bladder incontinence, no saddle/perineal anesthesia or any other red flag signs or symptoms. \par \par ---\par \par HPI Ms. MARJ OLSON is a 56 year F with PHx as below, referred by FRIEND who presents today with chief complaint of neck and bilateral upper extremity discomfort. Reports that it developed following a fall in 2003. It is located most prominently in the LEFT Cervical Spine;  there is radiation of the pain into the 5th digit left hand. The pain is presently 5/10 in severity on the numerical rating scale. It is tingling in nature. The pain is intermittent. There is diurnal worsening, during the night. The pain is aggravated with certain positions, rotation. The pain improves with rest, ibuprofen. The pain does impair the patient’s ability to sleep. \par \par Patient has no been seen by pain management in the past. \par Seeing Dr Castillo for chronic migraine ( on emgality and nurtec)\par Patient has trialed Physical therapy, skelaxin with mild relief\par  \par Reports there is present numbness, there is no weakness. Patient denies any bowel/bladder incontinence, no saddle/perineal anesthesia or any other red flag signs or symptoms. Reports regular BMs.\par

## 2021-12-22 NOTE — REVIEW OF SYSTEMS
[Back Pain] : back pain [Muscle Pain] : muscle pain [Radiating Pain] : radiating pain [Negative] : Heme/Lymph [Feeling Tired] : no fatigue [Discharge] : no discharge [Eye Pain] : no eye pain [Nasal Discharge] : no nasal discharge [SOB at rest] : no shortness of breath at rest [Cough] : no cough [Abdominal Pain] : no abdominal pain [Constipation] : no constipation [Skin Lesions] : no skin lesions [Dizziness] : no dizziness [Depression] : no depression [Muscle Weakness] : no muscle weakness

## 2021-12-30 ENCOUNTER — APPOINTMENT (OUTPATIENT)
Dept: PAIN MANAGEMENT | Facility: HOSPITAL | Age: 56
End: 2021-12-30

## 2022-01-14 ENCOUNTER — TRANSCRIPTION ENCOUNTER (OUTPATIENT)
Age: 57
End: 2022-01-14

## 2022-01-21 ENCOUNTER — APPOINTMENT (OUTPATIENT)
Dept: PLASTIC SURGERY | Facility: CLINIC | Age: 57
End: 2022-01-21

## 2022-01-26 ENCOUNTER — APPOINTMENT (OUTPATIENT)
Dept: PAIN MANAGEMENT | Facility: CLINIC | Age: 57
End: 2022-01-26
Payer: COMMERCIAL

## 2022-01-26 VITALS
TEMPERATURE: 98 F | BODY MASS INDEX: 28.71 KG/M2 | DIASTOLIC BLOOD PRESSURE: 70 MMHG | HEART RATE: 74 BPM | WEIGHT: 156 LBS | SYSTOLIC BLOOD PRESSURE: 124 MMHG | HEIGHT: 62 IN

## 2022-01-26 DIAGNOSIS — M54.16 RADICULOPATHY, LUMBAR REGION: ICD-10-CM

## 2022-01-26 PROCEDURE — 99213 OFFICE O/P EST LOW 20 MIN: CPT

## 2022-01-26 NOTE — PHYSICAL EXAM
[de-identified] : General: AAOx3 Well-developed and well-nourished.  \par No acute distress.\par Psychiatric: Behavior was cooperative\par Head:  Normocephalic and atraumatic\par Eyes:  Sclera white. Conjunctiva and eyelids pink and moist without discharge.\par Cardiovascular:  Regular\par Respiratory:  Trachea midline. Normal effort.\par No accessory muscle use with respiration\par Abdomen: Non distended, soft and nontender\par Skin:  No rashes, ulcers, or lesions appreciated.\par Minimal pain with extension,   normal ROM\par SLR LEFT positive\par Extremities: No edema \par Musculoskeletal: Moving all extremities freely\par Neuro: CN 2-12 Grossly intact\par Sensation to light touch is intact all extremities\par Gait: Ambulates without assistive device.

## 2022-01-26 NOTE — ASSESSMENT
[FreeTextEntry1] : Ms. MARJ OLSON is a 56 year F suffering from low back and left leg pain, that based upon today's subjective complaints, physical examination, and MRI imaging review, is likely secondary to lumbar radiculopathy\par \par >> Medications\par \par Chronic opioid use for non-malignant pain, in particular at high doses would not be recommended since it can potentially lead to hyperalgesia (hypersensitivity), tolerance and addiction. \par  \par Pregabalin 75 mg po BID\par  \par Cw Ibuprofen, skelaxin as needed\par \par >> Interventions\par  \par None indicated at this time, doing well and expressing gratitude with procedure last month.\par \par >> Therapy and Other Modalities\par  \par Cw PT 2x weekly 4 weeks\par \par Continue with daily home stretching regimen  \par \par >> Imaging and Other Studies\par  \par I have personally reviewed the images in detail together with the patient today, and I have answered all questions regarding this condition to the best of my ability, to the patient's satisfaction. \par .image \par  \par \par >> Consults\par \par None ordered

## 2022-01-26 NOTE — PHYSICAL EXAM
[de-identified] : General: AAOx3 Well-developed and well-nourished.  \par No acute distress.\par Psychiatric: Behavior was cooperative\par Head:  Normocephalic and atraumatic\par Eyes:  Sclera white. Conjunctiva and eyelids pink and moist without discharge.\par Cardiovascular:  Regular\par Respiratory:  Trachea midline. Normal effort.\par No accessory muscle use with respiration\par Abdomen: Non distended, soft and nontender\par Skin:  No rashes, ulcers, or lesions appreciated.\par Minimal pain with extension,   normal ROM\par SLR LEFT positive\par Extremities: No edema \par Musculoskeletal: Moving all extremities freely\par Neuro: CN 2-12 Grossly intact\par Sensation to light touch is intact all extremities\par Gait: Ambulates without assistive device.

## 2022-01-26 NOTE — HISTORY OF PRESENT ILLNESS
[Back Pain] : back pain [2] : an average pain level of 2/10 [0] : a minimum pain level of 0/10 [3] : a maximum pain level of 3/10 [FreeTextEntry1] : Interval Note:\par \par Had Left L4 and L5 transforaminal epidural steroid injection on 12/30 with excellent (80%) relief\par \par Moving bowels regularly. No recent falls. Doing home stretching / Pilates routine with some improvement\par \par Mild pain / stiffness in the AM, and occasionally noticeable at night otherwise very happy.\par \par Denies weakness, numbness. Patient denies any bowel/bladder incontinence, no saddle/perineal anesthesia or any other red flag signs or symptoms. \par \par ---\par \par HPI Ms. MARJ OLSON is a 56 year F with PHx as below, referred by FRIEND who presents today with chief complaint of neck and bilateral upper extremity discomfort. Reports that it developed following a fall in 2003. It is located most prominently in the LEFT Cervical Spine;  there is radiation of the pain into the 5th digit left hand. The pain is presently 5/10 in severity on the numerical rating scale. It is tingling in nature. The pain is intermittent. There is diurnal worsening, during the night. The pain is aggravated with certain positions, rotation. The pain improves with rest, ibuprofen. The pain does impair the patient’s ability to sleep. \par \par Patient has no been seen by pain management in the past. \par Seeing Dr Castillo for chronic migraine ( on emgality and nurtec)\par Patient has trialed Physical therapy, skelaxin with mild relief\par  \par Reports there is present numbness, there is no weakness. Patient denies any bowel/bladder incontinence, no saddle/perineal anesthesia or any other red flag signs or symptoms. Reports regular BMs.\par

## 2022-02-15 ENCOUNTER — NON-APPOINTMENT (OUTPATIENT)
Age: 57
End: 2022-02-15

## 2022-03-01 ENCOUNTER — TRANSCRIPTION ENCOUNTER (OUTPATIENT)
Age: 57
End: 2022-03-01

## 2022-03-16 RX ORDER — METAXALONE 800 MG/1
800 TABLET ORAL 3 TIMES DAILY
Qty: 75 | Refills: 0 | Status: ACTIVE | COMMUNITY
Start: 2022-03-16 | End: 1900-01-01

## 2022-03-22 ENCOUNTER — RESULT REVIEW (OUTPATIENT)
Age: 57
End: 2022-03-22

## 2022-03-28 ENCOUNTER — APPOINTMENT (OUTPATIENT)
Dept: PLASTIC SURGERY | Facility: CLINIC | Age: 57
End: 2022-03-28
Payer: SELF-PAY

## 2022-03-28 PROCEDURE — 64612 DESTROY NERVE FACE MUSCLE: CPT

## 2022-03-28 NOTE — PROCEDURE
[FreeTextEntry1] : Prominent glabellar frown lines  [FreeTextEntry2] :  Treatment of prominent glabellar frown lines with Botox [FreeTextEntry3] : Topical Quadricaine [FreeTextEntry4] : minimal  [FreeTextEntry5] : none  [FreeTextEntry6] : Following pre treatment with Quadricaine, 20 units Botox injected in glabellar frown lines.\par Tolerated well [FreeTextEntry7] : none

## 2022-03-28 NOTE — ASSESSMENT
[FreeTextEntry1] : A:\par Prominent glabellar frown lines \par P:\par Treatment of prominent glabellar frown lines with Botox\par Tolerated well \par Instructions reviewed

## 2022-03-28 NOTE — REASON FOR VISIT
[Procedure: _________] : a [unfilled] procedure visit [FreeTextEntry1] : Patient returns for treatment of prominent glabellar frown lines with Botox

## 2022-05-03 ENCOUNTER — APPOINTMENT (OUTPATIENT)
Dept: BREAST CENTER | Facility: CLINIC | Age: 57
End: 2022-05-03
Payer: COMMERCIAL

## 2022-05-03 ENCOUNTER — NON-APPOINTMENT (OUTPATIENT)
Age: 57
End: 2022-05-03

## 2022-05-03 VITALS
OXYGEN SATURATION: 97 % | SYSTOLIC BLOOD PRESSURE: 127 MMHG | HEART RATE: 62 BPM | BODY MASS INDEX: 28.35 KG/M2 | DIASTOLIC BLOOD PRESSURE: 67 MMHG | WEIGHT: 155 LBS

## 2022-05-03 DIAGNOSIS — Z91.041 RADIOGRAPHIC DYE ALLERGY STATUS: ICD-10-CM

## 2022-05-03 PROCEDURE — 99214 OFFICE O/P EST MOD 30 MIN: CPT

## 2022-05-03 NOTE — HISTORY OF PRESENT ILLNESS
[FreeTextEntry1] : S/P R Stereot Bx (7/23/09): +LCIS/ALH\par S/P R BR Bx (1997)([facility]): FA\par S/P R Br Bx w/NL (9/11/09): +ALH\par S/P tmx/ASAb x 5yrs\par +FH Br Ca (Mother 67 (BRCA-))\par +Ashkenazi ancestry\par BRCA ([facility]) (11/24/09): -\par S/P lap BSO (12/13)([facility]): Benign cyst\par Pt had Covid19 w/ sig sx's req brief hosp > now recovering. Ab+\par No other MH/FH changes. ROS reviewed/discussed. Taking Ca/Vit D. Last Bone Densitometry (5/19/14): +osteopenia\par Mammo/Sono (8/10/21): HD, NSF\par MRI (2/4/19): NSF > Pt relucant to have another MRI s/t allergic rxn despite steroids

## 2022-05-03 NOTE — PHYSICAL EXAM
[Normocephalic] : normocephalic [Atraumatic] : atraumatic [Supple] : supple [No Supraclavicular Adenopathy] : no supraclavicular adenopathy [No Cervical Adenopathy] : no cervical adenopathy [Normal Sinus Rhythm] : normal sinus rhythm [Examined in the supine and seated position] : examined in the supine and seated position [No dominant masses] : no dominant masses in right breast  [No dominant masses] : no dominant masses left breast [No Nipple Retraction] : no left nipple retraction [No Nipple Discharge] : no left nipple discharge [No Axillary Lymphadenopathy] : no left axillary lymphadenopathy [No Edema] : no edema [No Rashes] : no rashes [No Ulceration] : no ulceration [de-identified] : +diffuse thyromegaly > stable > observe

## 2022-07-04 ENCOUNTER — RESULT REVIEW (OUTPATIENT)
Age: 57
End: 2022-07-04

## 2022-07-06 ENCOUNTER — APPOINTMENT (OUTPATIENT)
Dept: NEUROLOGY | Facility: CLINIC | Age: 57
End: 2022-07-06

## 2022-07-06 VITALS
HEIGHT: 62 IN | HEART RATE: 71 BPM | DIASTOLIC BLOOD PRESSURE: 85 MMHG | OXYGEN SATURATION: 99 % | SYSTOLIC BLOOD PRESSURE: 127 MMHG | TEMPERATURE: 97.8 F | WEIGHT: 157 LBS | BODY MASS INDEX: 28.89 KG/M2

## 2022-07-06 DIAGNOSIS — R06.83 SNORING: ICD-10-CM

## 2022-07-06 PROCEDURE — 99214 OFFICE O/P EST MOD 30 MIN: CPT

## 2022-07-06 RX ORDER — PNV NO.153/FA/OM3/DHA/EPA/FISH 400-35-25
125 MCG TABLET,CHEWABLE ORAL
Qty: 90 | Refills: 0 | Status: ACTIVE | COMMUNITY
Start: 2022-02-14

## 2022-07-06 RX ORDER — ASPIRIN 81 MG
TABLET,CHEWABLE ORAL
Refills: 0 | Status: DISCONTINUED | COMMUNITY
End: 2022-07-06

## 2022-07-06 RX ORDER — TOBRAMYCIN AND DEXAMETHASONE 3; 1 MG/ML; MG/ML
0.3-0.1 SUSPENSION/ DROPS OPHTHALMIC
Qty: 5 | Refills: 0 | Status: COMPLETED | COMMUNITY
Start: 2022-02-28

## 2022-07-06 RX ORDER — VALACYCLOVIR 1 G/1
1 TABLET, FILM COATED ORAL
Qty: 30 | Refills: 0 | Status: ACTIVE | COMMUNITY
Start: 2022-01-25

## 2022-07-06 RX ORDER — BENZONATATE 100 MG/1
100 CAPSULE ORAL
Qty: 30 | Refills: 0 | Status: COMPLETED | COMMUNITY
Start: 2022-02-24

## 2022-07-06 RX ORDER — METAXALONE 800 MG/1
TABLET ORAL
Refills: 0 | Status: DISCONTINUED | COMMUNITY
End: 2022-07-06

## 2022-07-06 RX ORDER — PREGABALIN 75 MG/1
75 CAPSULE ORAL TWICE DAILY
Qty: 60 | Refills: 0 | Status: DISCONTINUED | OUTPATIENT
Start: 2021-10-20 | End: 2022-07-06

## 2022-07-06 RX ORDER — PREGABALIN 75 MG/1
75 CAPSULE ORAL TWICE DAILY
Qty: 60 | Refills: 1 | Status: DISCONTINUED | COMMUNITY
Start: 2021-10-21 | End: 2022-07-06

## 2022-07-06 RX ORDER — ALBUTEROL SULFATE 90 UG/1
108 (90 BASE) INHALANT RESPIRATORY (INHALATION)
Qty: 7 | Refills: 0 | Status: COMPLETED | COMMUNITY
Start: 2022-01-10

## 2022-07-06 RX ORDER — B2/MAGNESIUM CIT,OXID/FEVERFEW 200-180-50
TABLET ORAL
Refills: 0 | Status: ACTIVE | COMMUNITY

## 2022-07-06 RX ORDER — METHYLPREDNISOLONE 4 MG/1
4 TABLET ORAL
Qty: 21 | Refills: 0 | Status: COMPLETED | COMMUNITY
Start: 2022-02-28

## 2022-07-06 NOTE — HISTORY OF PRESENT ILLNESS
[FreeTextEntry1] : 7/6/22\par Headaches overall controlled (notes blurred vision at onset - no aura)\par \par no more than 4 Nurtec per month- there are months where she needs none.\par Does not always work (can take up to 3 hours)- sometime can take with Ibuprofen or Tylenol.\par \par Tolerating Emgality.\par \par sometimes wakes up a lot with am headache- ?fatigue\par gets better with time and with OTC medications.\par \par Tylenol and Advil - every 2 or 3 days\par \par Drinks 40-50 oz daily\par Drinks ice tea - no coffee or soda.\par \par Neck pain much improved - PT and special pillow have been helpful - she has cervicogenic headache- which is very different from her Migraines\par \par Takes Zyrtec and Flonase - \par last few months have been hard. \par Taking Migrelief daily\par \par 10/5/21\par Left sided retro-orbital headache, very mild- if she didn't take anything she would be okay\par \par Nurtec works better than Ubrelvy.\par Once or twice a month  she needs Nurtec\par \par Ketorolac helps with her back pain and headaches\par \par Notes Emgality works well overall.\par \par Did PT for neck pain and spasm\par pinky numb\par + neck pain, +elbow pain\par Fell down a flight of stairs in 2003\par No loss of strength\par \par 3/17/21\par 2-4 migraines per month\par didn't take gabapentin because friends didn't tolerate it.\par PT finished, accupuncture and pilates helps too.\par \par indomethacin makes her dizzy and nauseous.\par Tolerating Emgality.

## 2022-07-06 NOTE — ASSESSMENT
[FreeTextEntry1] : Continue Emgality.\par Continue Migrelief daily\par \par Nurtec prn +/- Ibuprofen.\par If headaches persist after 6 hours or at night, consider Benadryl 25 mg.\par Don't mix Ketorolac with Ibuprofen.\par \par Headache diary.\par Maintain hydration.\par \par Sleep study to evaluate for morning headache cause.

## 2022-08-09 ENCOUNTER — TRANSCRIPTION ENCOUNTER (OUTPATIENT)
Age: 57
End: 2022-08-09

## 2022-08-12 ENCOUNTER — APPOINTMENT (OUTPATIENT)
Dept: NEUROLOGY | Facility: CLINIC | Age: 57
End: 2022-08-12

## 2022-08-12 PROCEDURE — 95806 SLEEP STUDY UNATT&RESP EFFT: CPT

## 2022-08-15 ENCOUNTER — RESULT REVIEW (OUTPATIENT)
Age: 57
End: 2022-08-15

## 2022-08-15 ENCOUNTER — APPOINTMENT (OUTPATIENT)
Dept: NEUROLOGY | Facility: CLINIC | Age: 57
End: 2022-08-15

## 2022-08-16 ENCOUNTER — TRANSCRIPTION ENCOUNTER (OUTPATIENT)
Age: 57
End: 2022-08-16

## 2022-08-22 ENCOUNTER — TRANSCRIPTION ENCOUNTER (OUTPATIENT)
Age: 57
End: 2022-08-22

## 2022-08-23 ENCOUNTER — TRANSCRIPTION ENCOUNTER (OUTPATIENT)
Age: 57
End: 2022-08-23

## 2022-08-31 PROBLEM — Z00.00 ENCOUNTER FOR PREVENTIVE HEALTH EXAMINATION: Status: ACTIVE | Noted: 2022-08-31

## 2022-09-06 ENCOUNTER — APPOINTMENT (OUTPATIENT)
Dept: PULMONOLOGY | Facility: CLINIC | Age: 57
End: 2022-09-06

## 2022-09-06 ENCOUNTER — NON-APPOINTMENT (OUTPATIENT)
Age: 57
End: 2022-09-06

## 2022-09-06 VITALS
DIASTOLIC BLOOD PRESSURE: 70 MMHG | HEIGHT: 62 IN | SYSTOLIC BLOOD PRESSURE: 120 MMHG | OXYGEN SATURATION: 96 % | WEIGHT: 157 LBS | HEART RATE: 71 BPM | BODY MASS INDEX: 28.89 KG/M2

## 2022-09-06 DIAGNOSIS — G47.33 OBSTRUCTIVE SLEEP APNEA (ADULT) (PEDIATRIC): ICD-10-CM

## 2022-09-06 PROCEDURE — 99203 OFFICE O/P NEW LOW 30 MIN: CPT

## 2022-09-06 NOTE — HISTORY OF PRESENT ILLNESS
[FreeTextEntry1] : Dr. Antonina Vilchis\par Rosmery Hernandez\par 57 year old woman  with history of htn, migraine, covid ( 2020 was in hospital for 2 days and has prolonged symptoms for about 6 months) is here in the sleep center to address sleep apnea.  Patient is not sleepy with [place] sleepiness score of 7.  Patient was never told that she has snoring, no history of witnessed apneas.  Patient's bedtime is around 11 PM wakes up in the morning around 6 AM.  She feels tired when she wakes up.  Patient has frequent migraines, also noted to have morning headaches.  She is not sleepy while driving.\par \par Due to morning headaches patient underwent a home sleep study.  The home sleep study that she did is not reliable as she did not put the effort belt on.\par \par Today I talked about alternate options of getting the accurate diagnosis either doing a watch Pat study which is a home study that does not involve a chest belt or doing an in lab study which will be more accurate.\par \par Patient prefers to do a watch Pat study rather than sleeping a night in the hospital, will ask insurance if she can repeat the study at home using the watch pat device.\par

## 2022-09-06 NOTE — ASSESSMENT
[FreeTextEntry1] : 57-year-old female with history of migraines has morning headaches and patient underwent a home study.\par \par Patient did not do the test accurately she did not use the effort belt during the home sleep study and she also did not sleep well.  Results of that study are not accurate.\par \par Today we talked about repeating a sleep study either doing an in lab sleep study to get an accurate results or doing a home study that does not involve effort belt like a watch Pat study.\par \par Patient would rather do a watch Pat study than to sleep in the hospital.  Ordered watch Pat study and I will discuss with the patient after the sleep study is done.

## 2022-09-07 ENCOUNTER — APPOINTMENT (OUTPATIENT)
Dept: PLASTIC SURGERY | Facility: CLINIC | Age: 57
End: 2022-09-07

## 2022-09-07 PROCEDURE — 64612 DESTROY NERVE FACE MUSCLE: CPT

## 2022-09-07 NOTE — PROCEDURE
[FreeTextEntry1] : Prominent glabellar frown lines  [FreeTextEntry2] : Botox to glabellar area  [FreeTextEntry3] : Topical Quadricaine  [FreeTextEntry4] : minimal  [FreeTextEntry5] : none  [FreeTextEntry6] : Following pre treatment with Quadricaine, 20 units Quadricaine injected in the glabellar area.  \par Tolerated well  [FreeTextEntry7] : none

## 2022-09-07 NOTE — REASON FOR VISIT
[Procedure: _________] : a [unfilled] procedure visit [FreeTextEntry1] : Patient returns for Botox to the glabellar area

## 2022-09-07 NOTE — ASSESSMENT
[FreeTextEntry1] : A:\par Prominent glabellar frown lines \par P:\par Botox to glabellar frown lines\par Tolerated well \par Instructions reviewed

## 2022-10-03 ENCOUNTER — NON-APPOINTMENT (OUTPATIENT)
Age: 57
End: 2022-10-03

## 2022-10-13 ENCOUNTER — APPOINTMENT (OUTPATIENT)
Dept: PAIN MANAGEMENT | Facility: CLINIC | Age: 57
End: 2022-10-13

## 2022-10-13 VITALS
OXYGEN SATURATION: 99 % | WEIGHT: 157 LBS | HEART RATE: 84 BPM | DIASTOLIC BLOOD PRESSURE: 70 MMHG | SYSTOLIC BLOOD PRESSURE: 110 MMHG | BODY MASS INDEX: 28.89 KG/M2 | HEIGHT: 62 IN

## 2022-10-13 PROCEDURE — 99214 OFFICE O/P EST MOD 30 MIN: CPT

## 2022-10-13 NOTE — REVIEW OF SYSTEMS
[Neck Pain] : neck pain [Muscle Pain] : muscle pain [Radiating Pain] : radiating pain [Decreased ROM] : decreased range of motion [Negative] : Heme/Lymph

## 2022-10-13 NOTE — PHYSICAL EXAM
[de-identified] : General: AAOx3 Well-developed and well-nourished.  \par No acute distress.\par Psychiatric: Behavior was cooperative\par Head:  Normocephalic and atraumatic\par Eyes:  Sclera white. Conjunctiva and eyelids pink and moist without discharge.\par Cardiovascular:  Regular\par Respiratory:  Trachea midline. Normal effort.\par No accessory muscle use with respiration\par Abdomen: Non distended, soft and nontender\par Skin:  No rashes, ulcers, or lesions appreciated.\par Minimal pain with extension,   normal ROM\par SLR LEFT positive\par Extremities: No edema \par Musculoskeletal: Moving all extremities freely\par Neuro: CN 2-12 Grossly intact\par Sensation to light touch is intact all extremities\par Gait: Ambulates without assistive device.

## 2022-10-13 NOTE — HISTORY OF PRESENT ILLNESS
[2] : a current pain level of 2/10 [4] : an average pain level of 4/10 [FreeTextEntry1] : Interval Note:\par \par H/o neck and left arm pain s/p fall in 2003\par \par Saw PCP earlier last week, and considering this has been addressed in the past with PT to no avail, would like to consider other options\par \par Moving bowels regularly. No recent falls. Doing home stretching / Pilates routine with some improvement\par \par Denies weakness, numbness. Patient denies any bowel/bladder incontinence, no saddle/perineal anesthesia or any other red flag signs or symptoms. \par \par --\par \par Had Left L4 and L5 transforaminal epidural steroid injection on 12/30 with excellent (80%) relief\par \par ---\par \par HPI Ms. MARJ OLSON is a 56 year F with PHx as below, referred by FRIEND who presents today with chief complaint of neck and bilateral upper extremity discomfort. Reports that it developed following a fall in 2003. It is located most prominently in the LEFT Cervical Spine;  there is radiation of the pain into the 5th digit left hand. The pain is presently 5/10 in severity on the numerical rating scale. It is tingling in nature. The pain is intermittent. There is diurnal worsening, during the night. The pain is aggravated with certain positions, rotation. The pain improves with rest, ibuprofen. The pain does impair the patient’s ability to sleep. \par \par Patient has no been seen by pain management in the past. \par Seeing Dr Castillo for chronic migraine ( on emgality and nurtec)\par Patient has trialed Physical therapy, skelaxin with mild relief\par  \par Reports there is present numbness, there is no weakness. Patient denies any bowel/bladder incontinence, no saddle/perineal anesthesia or any other red flag signs or symptoms. Reports regular BMs.\par    [de-identified] : neck [de-identified] : hand

## 2022-10-13 NOTE — DATA REVIEWED
[FreeTextEntry1] :  MRI CERVICAL SPINE \par \par ORDER #: [id number] \par CC: ; ; ; \par End of cc's \par \par HISTORY: 56 years Female M54.12 MRI \par \par  PROCEDURE: MRI cervical spine without contrast \par \par  COMPARISON: None \par \par  TECHNIQUE: MRI cervical spine performed 1.5 Shefali magnet \par \par  FINDINGS: \par  There is maintenance of the normal cervical lordosis. \par \par  The prevertebral soft tissues appear within range of normal. \par \par  The craniocervical junction and clivus and C1-C2 distance appear within range \par  of normal \par \par  There is uniform marrow signal on all sequences \par \par  The cervical cord is uniform in signal intensity without evidence of syrinx or \par  suggestion of myelomalacia. \par \par  At C2-C3 , there is no significant central or foraminal stenosis. \par \par  At C3-C4 there is no significant central or foraminal stenosis. \par \par  At C4-C5, there is a small midline disc protrusion indenting the thecal sac \par  without significant cord deformity. A tiny cranially extruded disc fragment \par  may be present best seen on sagittal image 8 series 4. \par \par  At C5-C6 , there is broad-based disc osteophyte complex and bilateral \par  uncovertebral joint spurring which contributes to multifactorial central canal \par  stenosis reducing the AP dimension of the thecal sac to 7.5 mm and mild to \par  moderate bilateral foraminal narrowing. \par \par  At C6-C7 there is broad-based disc osteophyte complex and bilateral \par  uncovertebral joint spurring. Central disc bulge reduces the AP dimension of \par  the thecal sac to 9 mm. There is mild bilateral foraminal stenosis. \par \par  At C7-T1 , there is a shallow central disc bulge unchanged from prior exam. \par \par  At T1-T2 there is a central disc protrusion indenting the thecal sac without \par  cord deformity \par \par  IMPRESSION: \par  Multilevel degenerative disc disease as outlined above contributes to mild \par  central canal stenosis at C5-C6 and C6-C7. \par \par  Tiny midline disc protrusion with minimal cranial extrusion at C4-C5 \par \par  Other findings outlined above \par \par  Other findings discussed above \par \par \par  Thank you for allowing us to participate in the evaluation of this patient. \par \par  --- End of Report --- \par \par 6.5 at C7/t1

## 2022-10-13 NOTE — ASSESSMENT
[FreeTextEntry1] : Ms. MARJ OLSON is a 56 year F suffering from neck and left arm pain into the left pinky likely related to disc protrusion at c7/t1 and associated cervical radiculopathy\par \par >> Medications\par \par Chronic opioid use for non-malignant pain, in particular at high doses would not be recommended since it can potentially lead to hyperalgesia (hypersensitivity), tolerance and addiction. \par  \par CHANGE TO Pregabalin 25 mg po BID\par  \par Cw Ibuprofen, skelaxin as needed\par \par >> Interventions\par  \par None indicated at this time, doing well and expressing gratitude with procedure last month.\par \par >> Therapy and Other Modalities\par  \par Cw PT 2x weekly 4 weeks\par \par Continue with daily home stretching regimen  \par \par >> Imaging and Other Studies\par  \par I have personally reviewed the images in detail together with the patient today, and I have answered all questions regarding this condition to the best of my ability, to the patient's satisfaction. \par .image \par  \par \par >> Consults\par \par None ordered

## 2022-10-18 ENCOUNTER — RESULT REVIEW (OUTPATIENT)
Age: 57
End: 2022-10-18

## 2022-10-31 ENCOUNTER — APPOINTMENT (OUTPATIENT)
Dept: PAIN MANAGEMENT | Facility: CLINIC | Age: 57
End: 2022-10-31

## 2022-10-31 DIAGNOSIS — M48.061 SPINAL STENOSIS, LUMBAR REGION WITHOUT NEUROGENIC CLAUDICATION: ICD-10-CM

## 2022-10-31 PROCEDURE — 99212 OFFICE O/P EST SF 10 MIN: CPT | Mod: 95

## 2022-10-31 NOTE — REVIEW OF SYSTEMS
[Neck Pain] : neck pain [Muscle Pain] : muscle pain [Joint Stiffness] : joint stiffness [Negative] : Heme/Lymph

## 2022-10-31 NOTE — DATA REVIEWED
[FreeTextEntry1] : End of cc's \par \par History: CERVICAL SPONDYLOSIS. \par \par  Cervical spine x-rays 10/18/2022 \par \par  AP, odontoid, lateral and flexion and extension lateral views cervical spine \par  obtained. \par \par  The cervical lordosis is maintained. There is no abnormal translation with \par  motion. There is no acute fracture or subluxation. \par \par  There is multilevel cervical spondylosis. There is moderate intervertebral \par  disc height loss at C5-6 and mild disc height loss at C6-7 related to \par  degenerative disc disease. There is mild facet arthritis and C7-T1. \par \par  There is no abnormal prevertebral soft tissue swelling. \par \par  IMPRESSION: \par \par  No acute fracture, subluxation or abnormal translation with motion. \par \par  Mild cervical spondylosis. Moderate C5-6 and mild C6-7 degenerative disease. \par  C7-T1 facet arthritis.

## 2022-10-31 NOTE — PHYSICAL EXAM
[de-identified] : Physical Exam (Telemedicine): \par Gen: AAOX3, NAD\par HEENT: PERRLA, EOMI, NCAT, NP\par Pulmonary: No Signs of Respiratory Distress\par MSK: AROM WFL, Limitations ***\par Neurological: Grossly neurologically intact, Noted deficits none\par Gait: Normal, Non-antalgic, Sans AD\par Derm: No Rash, (-)Lesions, (-)Erythema, (-)Cyanosis \par Psych: Calm, Cooperative, Conversational\par \par Disclaimer: This is a limited examination for the purposes of conducting a telemedicine visit during the COVID-19 pandemic. Physical exam maneuvers were modified as necessary to allow patient to self perform. A focused physician physical examination will be performed during in person visits and should be referred to to determine need for further testing, interventions or degree of disability.

## 2022-10-31 NOTE — ASSESSMENT
[FreeTextEntry1] : Ms. MARJ OLSON is a 56 year F suffering from neck and left arm pain into the left pinky likely related to disc protrusion at c7/t1 and associated cervical radiculopathy\par \par >> Medications\par \par Chronic opioid use for non-malignant pain, in particular at high doses would not be recommended since it can potentially lead to hyperalgesia (hypersensitivity), tolerance and addiction. \par  \par CW Pregabalin 25 mg po BID\par  \par Cw Ibuprofen, skelaxin as needed\par \par >> Interventions\par  \par None indicated at this time, doing well and expressing gratitude with procedure last month.\par \par >> Therapy and Other Modalities\par  \par Cw PT 2x weekly 4 weeks\par \par Continue with daily home stretching regimen  \par \par >> Imaging and Other Studies\par  \par I have personally reviewed the images in detail together with the patient today, and I have answered all questions regarding this condition to the best of my ability, to the patient's satisfaction. \par  \par >> Consults\par \par None ordered

## 2022-10-31 NOTE — HISTORY OF PRESENT ILLNESS
[Neck Pain] : neck pain [Home] : at home, [unfilled] , at the time of the visit. [Medical Office: ([facility])___] : at the medical office located in  [Verbal consent obtained from patient] : the patient, [unfilled] [4] : a current pain level of 4/10 [FreeTextEntry1] : Interval Note:\par \par H/o neck and left arm pain s/p fall in 2003\par \par Completed XRAY for review, has been going to physical therapy\par \par Has used skelaxin with some relief however has not needed to begin Lyrica\par \par Moving bowels regularly. No recent falls. Doing home stretching / Pilates routine with some improvement\par \par Denies weakness, numbness. Patient denies any bowel/bladder incontinence, no saddle/perineal anesthesia or any other red flag signs or symptoms. \par \par --\par \par Had Left L4 and L5 transforaminal epidural steroid injection on 12/30 with excellent (80%) relief\par \par ---\par \par HPI Ms. MARJ OLSON is a 56 year F with PHx as below, referred by FRIEND who presents today with chief complaint of neck and bilateral upper extremity discomfort. Reports that it developed following a fall in 2003. It is located most prominently in the LEFT Cervical Spine;  there is radiation of the pain into the 5th digit left hand. The pain is presently 5/10 in severity on the numerical rating scale. It is tingling in nature. The pain is intermittent. There is diurnal worsening, during the night. The pain is aggravated with certain positions, rotation. The pain improves with rest, ibuprofen. The pain does impair the patient’s ability to sleep. \par \par Patient has no been seen by pain management in the past. \par Seeing Dr Castillo for chronic migraine ( on emgality and nurtec)\par Patient has trialed Physical therapy, skelaxin with mild relief\par  \par Reports there is present numbness, there is no weakness. Patient denies any bowel/bladder incontinence, no saddle/perineal anesthesia or any other red flag signs or symptoms. Reports regular BMs.\par

## 2022-11-01 ENCOUNTER — APPOINTMENT (OUTPATIENT)
Dept: BREAST CENTER | Facility: CLINIC | Age: 57
End: 2022-11-01

## 2022-11-01 VITALS
WEIGHT: 158 LBS | HEART RATE: 62 BPM | HEIGHT: 62 IN | BODY MASS INDEX: 29.08 KG/M2 | DIASTOLIC BLOOD PRESSURE: 74 MMHG | SYSTOLIC BLOOD PRESSURE: 131 MMHG

## 2022-11-01 PROCEDURE — 99214 OFFICE O/P EST MOD 30 MIN: CPT

## 2022-11-01 RX ORDER — METHYLPREDNISOLONE 4 MG/1
4 TABLET ORAL
Qty: 1 | Refills: 0 | Status: DISCONTINUED | COMMUNITY
Start: 2022-10-03 | End: 2022-11-01

## 2022-11-01 RX ORDER — FLUTICASONE PROPIONATE 50 UG/1
50 SPRAY, METERED NASAL
Qty: 16 | Refills: 0 | Status: DISCONTINUED | COMMUNITY
Start: 2022-01-21 | End: 2022-11-01

## 2022-11-01 RX ORDER — IBUPROFEN 800 MG/1
800 TABLET, FILM COATED ORAL EVERY 8 HOURS
Qty: 60 | Refills: 1 | Status: DISCONTINUED | COMMUNITY
Start: 2021-03-17 | End: 2022-11-01

## 2022-11-01 RX ORDER — PREGABALIN 25 MG/1
25 CAPSULE ORAL
Qty: 60 | Refills: 3 | Status: DISCONTINUED | COMMUNITY
Start: 2022-10-13 | End: 2022-11-01

## 2022-11-01 NOTE — HISTORY OF PRESENT ILLNESS
[FreeTextEntry1] : S/P R Stereot Bx (7/23/09): +LCIS/[facility]\par S/P R BR Bx (1997)([facility]): FA\par S/P R Br Bx w/NL (9/11/09): +ALH\par S/P tmx/ASAb x 5yrs\par +FH Br Ca (Mother 67 (BRCA-))\par +Ashkenazi ancestry\par BRCA ([facility]) (11/24/09): -\par S/P lap BSO (12/13)([facility]): Benign cyst\par Pt had Covid19 w/ sig sx's req brief hosp > now recovering. Ab+\par No other MH/FH changes. ROS reviewed/discussed. Taking Ca/Vit D. Last Bone Densitometry (5/19/14): +osteopenia\par Mammo/Sono (8/16/22): HD, NSF\par MRI (2/4/19): NSF > Pt relucant to have another MRI s/t allergic rxn despite steroids

## 2022-11-01 NOTE — PHYSICAL EXAM
[Normocephalic] : normocephalic [Atraumatic] : atraumatic [Supple] : supple [No Supraclavicular Adenopathy] : no supraclavicular adenopathy [No Cervical Adenopathy] : no cervical adenopathy [Normal Sinus Rhythm] : normal sinus rhythm [Examined in the supine and seated position] : examined in the supine and seated position [No dominant masses] : no dominant masses in right breast  [No dominant masses] : no dominant masses left breast [No Nipple Retraction] : no left nipple retraction [No Nipple Discharge] : no left nipple discharge [No Axillary Lymphadenopathy] : no left axillary lymphadenopathy [No Edema] : no edema [No Rashes] : no rashes [No Ulceration] : no ulceration [de-identified] : +thyromegaly L>R > followed [de-identified] : +dense FC tissue\par NSF  [de-identified] : +dense FC tissue\par NSF

## 2022-12-23 ENCOUNTER — APPOINTMENT (OUTPATIENT)
Dept: AFTER HOURS CARE | Facility: EMERGENCY ROOM | Age: 57
End: 2022-12-23
Payer: COMMERCIAL

## 2022-12-23 DIAGNOSIS — H92.02 OTALGIA, LEFT EAR: ICD-10-CM

## 2022-12-23 PROCEDURE — 99204 OFFICE O/P NEW MOD 45 MIN: CPT | Mod: 95

## 2023-01-27 NOTE — ASSESSMENT
[FreeTextEntry1] : 56 yo F with PMH of HTN, HLD, hypothyroid and migraines presenting with concern of L ear fullness and congestion. PT appears well. No fever. Hx of ear infections as adult requiring ABX. PT experiencing congestion and ear fullness. Speaking in full sentences, no fever. Normal work of breathing. Appears congested. No signs of otitis externa. Would prescribe antibiotics. Pt to follow up with their PMD in the 3-5 days.

## 2023-01-27 NOTE — PLAN
[With new medications prescribed] : Treat in place: with new medications prescribed [FreeTextEntry1] : Augmentin 875 mg 7d \par OTC Tylenol and ibuprofen\par Drink plenty of fluids

## 2023-01-27 NOTE — HISTORY OF PRESENT ILLNESS
[Home] : at home, [unfilled] , at the time of the visit. [Other Location: e.g. Home (Enter Location, City,State)___] : at [unfilled] [Verbal consent obtained from patient] : the patient, [unfilled] [FreeTextEntry8] : 58 yo F with PMH of HTN, HLD, hypothyroid and migraines presenting with concern of L ear fullness and congestion x1d. No fever since Sunday. Recent resolution of COVID 19 in last 2 weeks. No pain with movement of ear. No ear drainage. Crackling and popping sensation in L ear. No itchiness of ear. No allergies.  PT denies dizziness, tinnitus. Slight muffled hearing in L ear.

## 2023-02-22 ENCOUNTER — APPOINTMENT (OUTPATIENT)
Dept: PLASTIC SURGERY | Facility: CLINIC | Age: 58
End: 2023-02-22
Payer: SELF-PAY

## 2023-02-22 PROCEDURE — 64612 DESTROY NERVE FACE MUSCLE: CPT

## 2023-02-22 NOTE — PROCEDURE
[FreeTextEntry1] : Prominent glabellar frown lines  [FreeTextEntry2] : Botox to Prominent glabellar frown lines  [FreeTextEntry3] : Topical  [FreeTextEntry4] : minimal  [FreeTextEntry5] : none  [FreeTextEntry6] : Following pre treatment with Quadricaine, 20 units Botox injected in the glabellar frown lines.  \par Patient tolerated well  [FreeTextEntry7] : none

## 2023-02-22 NOTE — ASSESSMENT
[FreeTextEntry1] : A:\par Prominent glabellar frown lines \par P:\par Botox 20 units to glabella\par tolerated well \par instructions reviewed

## 2023-03-01 ENCOUNTER — APPOINTMENT (OUTPATIENT)
Dept: NEUROLOGY | Facility: CLINIC | Age: 58
End: 2023-03-01
Payer: COMMERCIAL

## 2023-03-01 VITALS
OXYGEN SATURATION: 99 % | HEIGHT: 62 IN | HEART RATE: 60 BPM | BODY MASS INDEX: 28.71 KG/M2 | DIASTOLIC BLOOD PRESSURE: 78 MMHG | SYSTOLIC BLOOD PRESSURE: 116 MMHG | TEMPERATURE: 97.6 F | WEIGHT: 156 LBS

## 2023-03-01 DIAGNOSIS — G44.86 CERVICOGENIC HEADACHE: ICD-10-CM

## 2023-03-01 PROCEDURE — 99214 OFFICE O/P EST MOD 30 MIN: CPT

## 2023-03-01 RX ORDER — LISINOPRIL 30 MG/1
TABLET ORAL
Refills: 0 | Status: DISCONTINUED | COMMUNITY
End: 2023-03-01

## 2023-03-01 RX ORDER — LISINOPRIL AND HYDROCHLOROTHIAZIDE TABLETS 10; 12.5 MG/1; MG/1
TABLET ORAL
Refills: 0 | Status: ACTIVE | COMMUNITY

## 2023-03-01 RX ORDER — AMOXICILLIN AND CLAVULANATE POTASSIUM 875; 125 MG/1; MG/1
875-125 TABLET, COATED ORAL
Qty: 14 | Refills: 0 | Status: DISCONTINUED | COMMUNITY
Start: 2022-12-23 | End: 2023-03-01

## 2023-03-01 RX ORDER — HYDROCHLOROTHIAZIDE 12.5 MG/1
CAPSULE, GELATIN COATED ORAL
Refills: 0 | Status: DISCONTINUED | COMMUNITY
End: 2023-03-01

## 2023-03-01 RX ORDER — OMEPRAZOLE MAGNESIUM 40 MG/1
CAPSULE, DELAYED RELEASE ORAL
Refills: 0 | Status: DISCONTINUED | COMMUNITY
End: 2023-03-01

## 2023-03-02 NOTE — ASSESSMENT
[FreeTextEntry1] : Continue Emgality.\par Continue Migrelief daily\par \par Nurtec prn +/- Ibuprofen. Or Ketorolac\par If headaches persist after 6 hours or at night, consider Benadryl 25 mg.\par Don't mix Ketorolac with Ibuprofen.\par \par Headache diary.\par Maintain hydration.\par \par For neck pain\par TENS unit\par We discussed Pregabalin. She declined at present.\par Consider accupuncture.

## 2023-03-02 NOTE — HISTORY OF PRESENT ILLNESS
[FreeTextEntry1] : Here in f/u.\par Headaches overall controlled \par \par Emgality is helpful\par Rescue- Nurtec; Toradol as needed. Takes Tylenol or Ibuprofen as needed more for back. \par \par Has tension headaches- 2-3 times a week - can last an entire day (skelaxin for the back)\par \par Sleeps well - \par \par Chronic neck and low back pain (tried gabapentin in the past - non-functioning- even on 100 mg)-\par Has not tried Pregabalin \par PT has been beneficial. \par Doing Pilates. \par \par \par 7/6/22\par Headaches overall controlled (notes blurred vision at onset - no aura)\par \par no more than 4 Nurtec per month- there are months where she needs none.\par Does not always work (can take up to 3 hours)- sometime can take with Ibuprofen or Tylenol.\par \par Tolerating Emgality.\par \par sometimes wakes up a lot with am headache- ?fatigue\par gets better with time and with OTC medications.\par \par Tylenol and Advil - every 2 or 3 days\par \par Drinks 40-50 oz daily\par Drinks ice tea - no coffee or soda.\par \par Neck pain much improved - PT and special pillow have been helpful - she has cervicogenic headache- which is very different from her Migraines\par \par Takes Zyrtec and Flonase - \par last few months have been hard. \par Taking Migrelief daily\par \par 10/5/21\par Left sided retro-orbital headache, very mild- if she didn't take anything she would be okay\par \par Nurtec works better than Ubrelvy.\par Once or twice a month  she needs Nurtec\par \par Ketorolac helps with her back pain and headaches\par \par Notes Emgality works well overall.\par \par Did PT for neck pain and spasm\par pinky numb\par + neck pain, +elbow pain\par Fell down a flight of stairs in 2003\par No loss of strength\par \par 3/17/21\par 2-4 migraines per month\par didn't take gabapentin because friends didn't tolerate it.\par PT finished, accupuncture and pilates helps too.\par \par indomethacin makes her dizzy and nauseous.\par Tolerating Emgality.

## 2023-03-02 NOTE — PHYSICAL EXAM
[FreeTextEntry1] : Physical examination \par General: No acute distress, Awake, Alert. \par \par Mental status \par Awake, alert, and oriented to person, time and place, Normal attention span and concentration, Recent and remote memory intact, Language intact, Fund of knowledge intact. \par Cranial Nerves \par II: VFF \par III, IV, VI: PERRL, EOMI. \par V: Facial sensation is normal B/L. \par VII: Facial strength is normal B/L. \par VIII: Gross hearing is intact. \par IX, X: Palate is midline and elevates symmetrically. \par XI: Trapezius normal strength. \par XII: Tongue midline without atrophy or fasciculations. \par \par Motor exam \par Muscle tone - no evidence of rigidity or resistance in all 4 extremities. \par No atrophy or fasciculations \par Muscle Strength: arms and legs, proximal and distal flexors and extensors are normal \par No UE drift.\par \par Coordination \par Finger to nose: Normal. \par Heel to shin: Normal. \par \par Gait \par Normal\par \par bilateral trapezius muscle spasm\par

## 2023-03-14 ENCOUNTER — TRANSCRIPTION ENCOUNTER (OUTPATIENT)
Age: 58
End: 2023-03-14

## 2023-03-27 ENCOUNTER — APPOINTMENT (OUTPATIENT)
Dept: PAIN MANAGEMENT | Facility: CLINIC | Age: 58
End: 2023-03-27
Payer: COMMERCIAL

## 2023-03-27 VITALS — SYSTOLIC BLOOD PRESSURE: 125 MMHG | DIASTOLIC BLOOD PRESSURE: 78 MMHG | OXYGEN SATURATION: 96 % | HEART RATE: 71 BPM

## 2023-03-27 DIAGNOSIS — Z87.39 PERSONAL HISTORY OF OTHER DISEASES OF THE MUSCULOSKELETAL SYSTEM AND CONNECTIVE TISSUE: ICD-10-CM

## 2023-03-27 PROCEDURE — 99214 OFFICE O/P EST MOD 30 MIN: CPT

## 2023-03-27 RX ORDER — ATORVASTATIN CALCIUM 20 MG/1
20 TABLET, FILM COATED ORAL
Refills: 0 | Status: ACTIVE | COMMUNITY

## 2023-03-27 NOTE — HISTORY OF PRESENT ILLNESS
[Neck Pain] : neck pain [4] : a current pain level of 4/10 [FreeTextEntry1] : Interval Note:\par Patient returns for follow-up today. She does have chronic neck and low back pain. Her worst complaint today is her neck and left hand pain. The fifth digit on her left hand goes numb. Quality of life is impaired. There has been a severe exacerbation of the patient's chronic pain. She does have pain in her trapezius. She has had injections in the past for her lumbar spine. She is going to physical therapy and has been active in pilates. Moving bowels regularly. No recent falls. Doing home stretching / Pilates routine with some improvement. Denies weakness, numbness. Patient denies any bowel/bladder incontinence, no saddle/perineal anesthesia or any other red flag signs or symptoms. \par \par HPI Ms. MARJ OLSON is a 56 year F with PHx as below, referred by FRIEND who presents today with chief complaint of neck and bilateral upper extremity discomfort. Reports that it developed following a fall in 2003. It is located most prominently in the LEFT Cervical Spine;  there is radiation of the pain into the 5th digit left hand. The pain is presently 5/10 in severity on the numerical rating scale. It is tingling in nature. The pain is intermittent. There is diurnal worsening, during the night. The pain is aggravated with certain positions, rotation. The pain improves with rest, ibuprofen. The pain does impair the patient’s ability to sleep. \par \par Patient has no been seen by pain management in the past. \par Seeing Dr Castillo for chronic migraine ( on emgality and nurtec)\par Patient has trialed Physical therapy, skelaxin with mild relief\par  \par Reports there is present numbness, there is no weakness. Patient denies any bowel/bladder incontinence, no saddle/perineal anesthesia or any other red flag signs or symptoms. Reports regular BMs.\par \par Interventions:\par Left L4 and L5 TFESI (12/30):\par

## 2023-03-27 NOTE — ASSESSMENT
[FreeTextEntry1] : 57 yof w/ severe neck and left arm pain which is chronic.\par \par I have personally reviewed the patient's MRI in detail and discussed it with them which is significant for multiple levels of disc bulging.\par \par The patient has failed to have relief with over six weeks of physical therapy within the last three months and all medications. GIven their failure to improve with all other conservative measures recommend MRI cervical spine. Patient will return to review imaging and plan for potential intervention.\par \par I believe that she has facet-mediated pain as well as radicular pain from the cervical spine.\par \par Recommend against MADDY at this time given her osteopenia and that symptoms are at baseline.\par \par Consider cervical medial branch block.\par \par Physical therapy prescribed - goal will be to increase ROM, strengthening, postural training, other modalities ad jovan which may include massage and stim. Goals of therapy discussed with the patient in detail and will be discussed with physical therapist. Patient will follow-up following course of physical therapy to monitor progress and adjust therapy as needed.\par \par Acetaminophen 1,000 mg q8h prn for moderate pain. Risks, benefits, and alternatives of acetaminophen discussed with patient.\par \par Ibuprofen 600 mg q8h prn add when pain is not adequately controlled with acetaminophen. Risks, benefits, and alternatives of ibuprofen discussed with patient.\par \par Diet and nutritional strategies discussed which may improve patients pain and will improve overall health.\par

## 2023-03-27 NOTE — DATA REVIEWED
[FreeTextEntry1] : PROCEDURE: MRI CERVICAL SPINE \par \par ORDER #: [id number] \par CC: ; ; ; \par End of cc's \par \par HISTORY: 56 years Female M54.12 MRI \par \par  PROCEDURE: MRI cervical spine without contrast \par \par  COMPARISON: None \par \par  TECHNIQUE: MRI cervical spine performed 1.5 Shefali magnet \par \par  FINDINGS: \par  There is maintenance of the normal cervical lordosis. \par \par  The prevertebral soft tissues appear within range of normal. \par \par  The craniocervical junction and clivus and C1-C2 distance appear within range \par  of normal \par \par  There is uniform marrow signal on all sequences \par \par  The cervical cord is uniform in signal intensity without evidence of syrinx or \par  suggestion of myelomalacia. \par \par  At C2-C3 , there is no significant central or foraminal stenosis. \par \par  At C3-C4 there is no significant central or foraminal stenosis. \par \par  At C4-C5, there is a small midline disc protrusion indenting the thecal sac \par  without significant cord deformity. A tiny cranially extruded disc fragment \par  may be present best seen on sagittal image 8 series 4. \par \par  At C5-C6 , there is broad-based disc osteophyte complex and bilateral \par  uncovertebral joint spurring which contributes to multifactorial central canal \par  stenosis reducing the AP dimension of the thecal sac to 7.5 mm and mild to \par  moderate bilateral foraminal narrowing. \par \par  At C6-C7 there is broad-based disc osteophyte complex and bilateral \par  uncovertebral joint spurring. Central disc bulge reduces the AP dimension of \par  the thecal sac to 9 mm. There is mild bilateral foraminal stenosis. \par \par  At C7-T1 , there is a shallow central disc bulge unchanged from prior exam. \par \par  At T1-T2 there is a central disc protrusion indenting the thecal sac without \par  cord deformity \par \par  IMPRESSION: \par  Multilevel degenerative disc disease as outlined above contributes to mild \par  central canal stenosis at C5-C6 and C6-C7. \par \par  Tiny midline disc protrusion with minimal cranial extrusion at C4-C5 \par \par  Other findings outlined above \par \par  Other findings discussed above \par

## 2023-04-02 ENCOUNTER — RESULT REVIEW (OUTPATIENT)
Age: 58
End: 2023-04-02

## 2023-04-20 ENCOUNTER — APPOINTMENT (OUTPATIENT)
Dept: PLASTIC SURGERY | Facility: CLINIC | Age: 58
End: 2023-04-20
Payer: SELF-PAY

## 2023-04-20 PROCEDURE — 11950 SUBQ NJX FILLING MATRL 1CC/<: CPT

## 2023-04-20 PROCEDURE — 64612 DESTROY NERVE FACE MUSCLE: CPT

## 2023-04-21 NOTE — PROCEDURE
[FreeTextEntry1] : Prominent glabellar frown line sand mid face volume loss  [FreeTextEntry2] : Botox 20 to glabella and 1 syringe Voluma to mid face [FreeTextEntry3] : Topical Quadricaine  [FreeTextEntry4] : minimal  [FreeTextEntry5] : none  [FreeTextEntry6] : Following pre treatment with Quadricaine, 20 units Botox injected in glabellar frown lines, and 1 syringe Voluma in mid face \par Patient tolerated well  [FreeTextEntry7] : none

## 2023-04-21 NOTE — ASSESSMENT
[FreeTextEntry1] : A:\par Prominent glabellar frown line sand mid face volume loss \par P:\par Botox 20 to glabella and 1 syringe Voluma to mid face\par Tolerated well \par Instructions reviewed

## 2023-04-21 NOTE — REASON FOR VISIT
[Procedure: _________] : a [unfilled] procedure visit [FreeTextEntry1] : Patient returns for treatment of prominent glabellar frown line sand mid face volume loss

## 2023-04-24 ENCOUNTER — APPOINTMENT (OUTPATIENT)
Dept: PAIN MANAGEMENT | Facility: CLINIC | Age: 58
End: 2023-04-24
Payer: COMMERCIAL

## 2023-04-24 VITALS
BODY MASS INDEX: 28.89 KG/M2 | DIASTOLIC BLOOD PRESSURE: 82 MMHG | WEIGHT: 157 LBS | HEIGHT: 62 IN | SYSTOLIC BLOOD PRESSURE: 127 MMHG

## 2023-04-24 PROCEDURE — 99214 OFFICE O/P EST MOD 30 MIN: CPT

## 2023-04-25 NOTE — ASSESSMENT
[FreeTextEntry1] : 57 yof w/ severe neck and left arm pain which is chronic here to review MRI.\par \par I have personally reviewed the patient's MRI in detail and discussed it with them which is significant for multiple levels of disc bulging.\par \par I believe that she has facet-mediated pain as well as radicular pain from the cervical spine.\par \par Recommend against MADDY at this time given her osteopenia and that symptoms are at baseline.\par \par Continue her current exercise routine.\par \par Physical therapy prescribed - goal will be to increase ROM, strengthening, postural training, other modalities ad jovan which may include massage and stim. Goals of therapy discussed with the patient in detail and will be discussed with physical therapist. Patient will follow-up following course of physical therapy to monitor progress and adjust therapy as needed.\par \par Acetaminophen 1,000 mg q8h prn for moderate pain. Risks, benefits, and alternatives of acetaminophen discussed with patient.\par \par Ibuprofen 600 mg q8h prn add when pain is not adequately controlled with acetaminophen. Risks, benefits, and alternatives of ibuprofen discussed with patient.\par \par Diet and nutritional strategies discussed which may improve patients pain and will improve overall health.\par

## 2023-04-25 NOTE — PHYSICAL EXAM
[de-identified] : Constitutional: Well-developed, in no acute distress\par \par Musculoskeletal:\par Cervical Spine:   \par Gait: Antalgic\par Inspection: Normal curvature, no abnormal kyphosis or scoliosis\par \par Facet loading: pain bilaterally\par \par Palpation:\par Cervical paraspinal muscles: pain bilaterally\par 		\par Muscle Strength:\par Deltoid: 5/5 bilaterally\par Biceps: 5/5 bilaterally\par Triceps: 5/5 bilaterally\par Adductor pollicis: 5/5 bilaterally\par \par Sensation: normal and equal in bilateral upper extremities\par \par Extremity: no edema noted\par Neurological: Memory normal, AAO x 3, Cranial nerves II - XII grossly normal\par Psychiatric: Appropriate mood and affect, oriented to time, place, person, and situation\par

## 2023-04-25 NOTE — HISTORY OF PRESENT ILLNESS
[7] : 3. What number best describes how, during the past week, pain has interfered with your general activity? 7/10 pain [Neck Pain] : neck pain [4] : a current pain level of 4/10 [FreeTextEntry1] : Interval Note:\par Patient returns for follow-up today to review her MRI. The fifth digit on her left hand goes numb. She does have pain in her trapezius. She has had injections in the past for her lumbar spine. She is going to physical therapy and has been active in [employer]. Reports that she is feeling a bit better, pain is more episodic. There is subjective weakness when the pain is bad. \par \par HPI Ms. MARJ OLSON is a 56 year F with PHx as below, referred by FRIEND who presents today with chief complaint of neck and bilateral upper extremity discomfort. Reports that it developed following a fall in 2003. It is located most prominently in the LEFT Cervical Spine;  there is radiation of the pain into the 5th digit left hand. The pain is presently 5/10 in severity on the numerical rating scale. It is tingling in nature. The pain is intermittent. There is diurnal worsening, during the night. The pain is aggravated with certain positions, rotation. The pain improves with rest, ibuprofen. The pain does impair the patient’s ability to sleep. \par \par Patient has no been seen by pain management in the past. \par Seeing Dr Castillo for chronic migraine ( on emgality and nurtec)\par Patient has trialed Physical therapy, skelaxin with mild relief\par  \par Reports there is present numbness, there is no weakness. Patient denies any bowel/bladder incontinence, no saddle/perineal anesthesia or any other red flag signs or symptoms. Reports regular BMs.\par \par Interventions:\par Left L4 and L5 TFESI (12/30):\par    [FreeTextEntry2] : 21

## 2023-04-25 NOTE — DATA REVIEWED
[FreeTextEntry1] : PROCEDURE: MRI CERVICAL SPINE \par \par  There is maintenance of the normal cervical lordosis. \par \par  The prevertebral soft tissues appear within range of normal. \par \par  The craniocervical junction and clivus and C1-C2 distance appear within range \par  of normal \par \par  There is uniform marrow signal on all sequences \par \par  The cervical cord is uniform in signal intensity without evidence of syrinx or \par  suggestion of myelomalacia. \par \par  At C2-C3 , there is no significant central or foraminal stenosis. \par \par  At C3-C4 there is no significant central or foraminal stenosis. \par \par  At C4-C5, there is a small midline disc protrusion indenting the thecal sac \par  without significant cord deformity. A tiny cranially extruded disc fragment \par  may be present best seen on sagittal image 8 series 4. \par \par  At C5-C6 , there is broad-based disc osteophyte complex and bilateral \par  uncovertebral joint spurring which contributes to multifactorial central canal \par  stenosis reducing the AP dimension of the thecal sac to 7.5 mm and mild to \par  moderate bilateral foraminal narrowing. \par \par  At C6-C7 there is broad-based disc osteophyte complex and bilateral \par  uncovertebral joint spurring. Central disc bulge reduces the AP dimension of \par  the thecal sac to 9 mm. There is mild bilateral foraminal stenosis. \par \par  At C7-T1 , there is a shallow central disc bulge unchanged from prior exam. \par \par  At T1-T2 there is a central disc protrusion indenting the thecal sac without \par  cord deformity \par \par  IMPRESSION: \par  Multilevel degenerative disc disease as outlined above contributes to mild \par  central canal stenosis at C5-C6 and C6-C7. \par \par  Tiny midline disc protrusion with minimal cranial extrusion at C4-C5 \par \par  Other findings outlined above \par \par  Other findings discussed above \par

## 2023-05-02 ENCOUNTER — APPOINTMENT (OUTPATIENT)
Dept: BREAST CENTER | Facility: CLINIC | Age: 58
End: 2023-05-02
Payer: COMMERCIAL

## 2023-05-02 VITALS
WEIGHT: 157 LBS | SYSTOLIC BLOOD PRESSURE: 138 MMHG | BODY MASS INDEX: 28.89 KG/M2 | HEART RATE: 75 BPM | HEIGHT: 62 IN | DIASTOLIC BLOOD PRESSURE: 74 MMHG

## 2023-05-02 PROCEDURE — 99214 OFFICE O/P EST MOD 30 MIN: CPT

## 2023-05-02 RX ORDER — METHYLPREDNISOLONE 4 MG/1
4 TABLET ORAL
Qty: 1 | Refills: 0 | Status: DISCONTINUED | COMMUNITY
Start: 2023-03-14 | End: 2023-05-02

## 2023-05-02 RX ORDER — TIZANIDINE 4 MG/1
4 TABLET ORAL TWICE DAILY
Qty: 30 | Refills: 1 | Status: DISCONTINUED | COMMUNITY
Start: 2023-03-14 | End: 2023-05-02

## 2023-05-02 RX ORDER — ALPRAZOLAM 0.25 MG/1
0.25 TABLET ORAL
Qty: 60 | Refills: 0 | Status: DISCONTINUED | COMMUNITY
Start: 2022-02-14 | End: 2023-05-02

## 2023-05-02 NOTE — PHYSICAL EXAM
[Normocephalic] : normocephalic [Atraumatic] : atraumatic [Supple] : supple [No Supraclavicular Adenopathy] : no supraclavicular adenopathy [No Cervical Adenopathy] : no cervical adenopathy [Normal Sinus Rhythm] : normal sinus rhythm [Examined in the supine and seated position] : examined in the supine and seated position [No dominant masses] : no dominant masses in right breast  [No dominant masses] : no dominant masses left breast [No Nipple Retraction] : no left nipple retraction [No Nipple Discharge] : no left nipple discharge [No Axillary Lymphadenopathy] : no left axillary lymphadenopathy [No Edema] : no edema [No Rashes] : no rashes [No Ulceration] : no ulceration [de-identified] : +thyromegaly > followed [de-identified] : +dense FC tissue\par NSF  [de-identified] : +dense FC tissue\par NSF

## 2023-05-02 NOTE — HISTORY OF PRESENT ILLNESS
[FreeTextEntry1] : S/P R Stereot Bx (7/23/09): +LCIS/[facility]\par S/P R BR Bx (1997)([facility]): FA\par S/P R Br Bx w/NL (9/11/09): +[facility]\par S/P tmx/ASAb x 5yrs\par +FH Br Ca (Mother 67 (BRCA-))\par +Ashkenazi ancestry\par BRCA ([facility]) (11/24/09): -\par S/P lap BSO (12/13)([facility]): Benign cyst\par +h/o COVID (3/19, 12/22) > recovered\par Colonoscopy(?2015): "WNL"\par PAP/Pelvic (3/22): "WNL"  \par No other MH/FH changes. ROS reviewed/discussed. Taking Ca/Vit D. Last Bone Densitometry (5/19/14): +osteopenia\par Mammo/Sono (8/16/22): HD, NSF\par MRI (2/4/19): NSF > Pt reluctant to have another MRI s/t allergic rxn despite steroids

## 2023-07-21 ENCOUNTER — APPOINTMENT (OUTPATIENT)
Dept: PLASTIC SURGERY | Facility: CLINIC | Age: 58
End: 2023-07-21
Payer: SELF-PAY

## 2023-07-21 PROCEDURE — 99212 OFFICE O/P EST SF 10 MIN: CPT | Mod: NC

## 2023-07-31 ENCOUNTER — APPOINTMENT (OUTPATIENT)
Dept: SURGERY | Facility: CLINIC | Age: 58
End: 2023-07-31

## 2023-08-23 ENCOUNTER — APPOINTMENT (OUTPATIENT)
Dept: NEUROLOGY | Facility: CLINIC | Age: 58
End: 2023-08-23

## 2023-08-28 ENCOUNTER — APPOINTMENT (OUTPATIENT)
Dept: PAIN MANAGEMENT | Facility: CLINIC | Age: 58
End: 2023-08-28
Payer: COMMERCIAL

## 2023-08-28 VITALS
DIASTOLIC BLOOD PRESSURE: 85 MMHG | BODY MASS INDEX: 28.89 KG/M2 | WEIGHT: 157 LBS | HEIGHT: 62 IN | SYSTOLIC BLOOD PRESSURE: 134 MMHG

## 2023-08-28 DIAGNOSIS — M54.12 RADICULOPATHY, CERVICAL REGION: ICD-10-CM

## 2023-08-28 PROCEDURE — 99214 OFFICE O/P EST MOD 30 MIN: CPT

## 2023-08-28 NOTE — PHYSICAL EXAM
[de-identified] : Constitutional: Well-developed, in no acute distress\par  \par  Musculoskeletal:\par  Cervical Spine:   \par  Gait: Antalgic\par  Inspection: Normal curvature, no abnormal kyphosis or scoliosis\par  \par  Facet loading: pain bilaterally\par  \par  Palpation:\par  Cervical paraspinal muscles: pain bilaterally\par  		\par  Muscle Strength:\par  Deltoid: 5/5 bilaterally\par  Biceps: 5/5 bilaterally\par  Triceps: 5/5 bilaterally\par  Adductor pollicis: 5/5 bilaterally\par  \par  Sensation: normal and equal in bilateral upper extremities\par  \par  Extremity: no edema noted\par  Neurological: Memory normal, AAO x 3, Cranial nerves II - XII grossly normal\par  Psychiatric: Appropriate mood and affect, oriented to time, place, person, and situation\par

## 2023-08-28 NOTE — HISTORY OF PRESENT ILLNESS
[2] : a maximum pain level of 2/10 [Neck Pain] : neck pain [4] : a current pain level of 4/10 [7] : 3. What number best describes how, during the past week, pain has interfered with your general activity? 7/10 pain [FreeTextEntry1] : Interval Note: Patient returns for follow-up today. Her pain in her neck is at baseline. Worst complaint is low back pain. Minimal radiation. Worse w/ activity. She remains active in Pilates and uses cupping.   On previous visit: "The fifth digit on her left hand goes numb. She does have pain in her trapezius. She has had injections in the past for her lumbar spine. She is going to physical therapy and has been active in pilates. Reports that she is feeling a bit better, pain is more episodic. There is subjective weakness when the pain is bad."   HPI Ms. MARJ OLSON is a 56 year F with PHx as below, referred by FRIEND who presents today with chief complaint of neck and bilateral upper extremity discomfort. Reports that it developed following a fall in 2003. It is located most prominently in the LEFT Cervical Spine;  there is radiation of the pain into the 5th digit left hand. The pain is presently 5/10 in severity on the numerical rating scale. It is tingling in nature. The pain is intermittent. There is diurnal worsening, during the night. The pain is aggravated with certain positions, rotation. The pain improves with rest, ibuprofen. The pain does impair the patient's ability to sleep.   Patient has no been seen by pain management in the past.  Seeing Dr Castillo for chronic migraine ( on emgality and nurtec) Patient has trialed Physical therapy, skelaxin with mild relief   Reports there is present numbness, there is no weakness. Patient denies any bowel/bladder incontinence, no saddle/perineal anesthesia or any other red flag signs or symptoms. Reports regular BMs.  Interventions: Left L4 and L5 TFESI (12/30):    [FreeTextEntry2] : 21

## 2023-08-28 NOTE — ASSESSMENT
[FreeTextEntry1] : 58 yof w/ severe neck and left arm pain which is chronic here to review MRI.  I have personally reviewed the patient's MRI in detail and discussed it with them which is significant for multiple levels of disc bulging.  I believe that she has facet-mediated pain as well as radicular pain from the cervical spine.  Recommend against MADDY at this time given her osteopenia and that symptoms are at baseline.  Continue her current exercise routine.  Would consider lumbar medial branch block if pain persists or worsens.  Physical therapy prescribed - goal will be to increase ROM, strengthening, postural training, other modalities ad jovan which may include massage and stim. Goals of therapy discussed with the patient in detail and will be discussed with physical therapist. Patient will follow-up following course of physical therapy to monitor progress and adjust therapy as needed.  Acetaminophen 1,000 mg q8h prn for moderate pain. Risks, benefits, and alternatives of acetaminophen discussed with patient.  Ibuprofen 600 mg q8h prn add when pain is not adequately controlled with acetaminophen. Risks, benefits, and alternatives of ibuprofen discussed with patient.  Diet and nutritional strategies discussed which may improve patients pain and will improve overall health.

## 2023-09-07 ENCOUNTER — RESULT REVIEW (OUTPATIENT)
Age: 58
End: 2023-09-07

## 2023-09-14 ENCOUNTER — APPOINTMENT (OUTPATIENT)
Dept: PLASTIC SURGERY | Facility: CLINIC | Age: 58
End: 2023-09-14
Payer: SELF-PAY

## 2023-09-14 PROCEDURE — 64612 DESTROY NERVE FACE MUSCLE: CPT

## 2023-09-21 ENCOUNTER — APPOINTMENT (OUTPATIENT)
Dept: PLASTIC SURGERY | Facility: CLINIC | Age: 58
End: 2023-09-21
Payer: SELF-PAY

## 2023-09-21 PROCEDURE — 15789 CHEMICAL PEEL FACIAL DERMAL: CPT

## 2023-10-11 ENCOUNTER — APPOINTMENT (OUTPATIENT)
Dept: GASTROENTEROLOGY | Facility: CLINIC | Age: 58
End: 2023-10-11

## 2023-10-26 ENCOUNTER — APPOINTMENT (OUTPATIENT)
Dept: PLASTIC SURGERY | Facility: CLINIC | Age: 58
End: 2023-10-26
Payer: SELF-PAY

## 2023-10-26 PROCEDURE — 15789 CHEMICAL PEEL FACIAL DERMAL: CPT

## 2023-11-22 ENCOUNTER — APPOINTMENT (OUTPATIENT)
Dept: PLASTIC SURGERY | Facility: CLINIC | Age: 58
End: 2023-11-22

## 2023-11-27 ENCOUNTER — RX RENEWAL (OUTPATIENT)
Age: 58
End: 2023-11-27

## 2023-11-27 ENCOUNTER — TRANSCRIPTION ENCOUNTER (OUTPATIENT)
Age: 58
End: 2023-11-27

## 2023-11-28 ENCOUNTER — APPOINTMENT (OUTPATIENT)
Dept: BREAST CENTER | Facility: CLINIC | Age: 58
End: 2023-11-28
Payer: COMMERCIAL

## 2023-11-28 VITALS
BODY MASS INDEX: 29.08 KG/M2 | SYSTOLIC BLOOD PRESSURE: 122 MMHG | WEIGHT: 158 LBS | DIASTOLIC BLOOD PRESSURE: 76 MMHG | HEIGHT: 62 IN | HEART RATE: 75 BPM

## 2023-11-28 DIAGNOSIS — N60.19 DIFFUSE CYSTIC MASTOPATHY OF UNSPECIFIED BREAST: ICD-10-CM

## 2023-11-28 DIAGNOSIS — N60.91 UNSPECIFIED BENIGN MAMMARY DYSPLASIA OF RIGHT BREAST: ICD-10-CM

## 2023-11-28 DIAGNOSIS — R92.2 INCONCLUSIVE MAMMOGRAM: ICD-10-CM

## 2023-11-28 DIAGNOSIS — U07.1 COVID-19: ICD-10-CM

## 2023-11-28 DIAGNOSIS — Z80.3 FAMILY HISTORY OF MALIGNANT NEOPLASM OF BREAST: ICD-10-CM

## 2023-11-28 DIAGNOSIS — R92.30 INCONCLUSIVE MAMMOGRAM: ICD-10-CM

## 2023-11-28 DIAGNOSIS — E01.0 IODINE-DEFICIENCY RELATED DIFFUSE (ENDEMIC) GOITER: ICD-10-CM

## 2023-11-28 DIAGNOSIS — D05.00 LOBULAR CARCINOMA IN SITU OF UNSPECIFIED BREAST: ICD-10-CM

## 2023-11-28 PROCEDURE — 99213 OFFICE O/P EST LOW 20 MIN: CPT

## 2023-12-19 ENCOUNTER — APPOINTMENT (OUTPATIENT)
Dept: NEUROLOGY | Facility: CLINIC | Age: 58
End: 2023-12-19
Payer: COMMERCIAL

## 2023-12-19 VITALS
HEIGHT: 62 IN | BODY MASS INDEX: 29.08 KG/M2 | WEIGHT: 158 LBS | DIASTOLIC BLOOD PRESSURE: 79 MMHG | OXYGEN SATURATION: 98 % | HEART RATE: 58 BPM | SYSTOLIC BLOOD PRESSURE: 127 MMHG

## 2023-12-19 DIAGNOSIS — M54.2 CERVICALGIA: ICD-10-CM

## 2023-12-19 DIAGNOSIS — G89.29 CERVICALGIA: ICD-10-CM

## 2023-12-19 DIAGNOSIS — G43.109 MIGRAINE WITH AURA, NOT INTRACTABLE, W/OUT STATUS MIGRAINOSUS: ICD-10-CM

## 2023-12-19 DIAGNOSIS — G43.719 CHRONIC MIGRAINE W/OUT AURA, INTRACTABLE, W/OUT STATUS MIGRAINOSUS: ICD-10-CM

## 2023-12-19 PROCEDURE — 99213 OFFICE O/P EST LOW 20 MIN: CPT

## 2023-12-19 RX ORDER — FAMOTIDINE 40 MG/1
40 TABLET, FILM COATED ORAL
Refills: 0 | Status: DISCONTINUED | COMMUNITY
End: 2023-12-19

## 2023-12-19 RX ORDER — LANSOPRAZOLE 30 MG/1
30 CAPSULE, DELAYED RELEASE ORAL
Refills: 0 | Status: ACTIVE | COMMUNITY

## 2023-12-19 NOTE — ASSESSMENT
[FreeTextEntry1] : Continue Emgality.  Continue Migrelief daily  Nurtec prn +/- Ibuprofen. Or Ketorolac If headaches persist after 6 hours or at night, consider Benadryl 25 mg.  Don't mix Ketorolac with Ibuprofen.  Headache diary.  Maintain hydration.

## 2023-12-19 NOTE — HISTORY OF PRESENT ILLNESS
[FreeTextEntry1] : 12/19/23 Overall headaches are under decent control. Tries to keep hydrated Does try to sleep on time well  triggers: smells   Tolerating Emgality No side effects   Breakthroughs: 0-3 per month - can have months without any  For rescue: Benadryl, ibuprofen or ketorolac - Nurtec can help but takes longer -   Still taking Migrelief -   Still has bouts of neck pain  left digit 4,5 can go numb on nights with significant neck pain  Doing pilates daily   3/1/23 Here in f/u. Headaches overall controlled   Emgality is helpful Rescue- Nurtec; Toradol as needed. Takes Tylenol or Ibuprofen as needed more for back.   Has tension headaches- 2-3 times a week - can last an entire day (skelaxin for the back)  Sleeps well -   Chronic neck and low back pain (tried gabapentin in the past - non-functioning- even on 100 mg)- Has not tried Pregabalin  PT has been beneficial.  Doing Pilates.    7/6/22 Headaches overall controlled (notes blurred vision at onset - no aura)  no more than 4 Nurtec per month- there are months where she needs none. Does not always work (can take up to 3 hours)- sometime can take with Ibuprofen or Tylenol.  Tolerating Emgality.  sometimes wakes up a lot with am headache- ?fatigue gets better with time and with OTC medications.  Tylenol and Advil - every 2 or 3 days  Drinks 40-50 oz daily Drinks ice tea - no coffee or soda.  Neck pain much improved - PT and special pillow have been helpful - she has cervicogenic headache- which is very different from her Migraines  Takes Zyrtec and Flonase -  last few months have been hard.  Taking Migrelief daily  10/5/21 Left sided retro-orbital headache, very mild- if she didn't take anything she would be okay  Nurtec works better than Ubrelvy. Once or twice a month  she needs Nurtec  Ketorolac helps with her back pain and headaches  Notes Emgality works well overall.  Did PT for neck pain and spasm pinky numb + neck pain, +elbow pain Fell down a flight of stairs in 2003 No loss of strength  3/17/21 2-4 migraines per month didn't take gabapentin because friends didn't tolerate it. PT finished, accupuncture and pilates helps too.  indomethacin makes her dizzy and nauseous. Tolerating Emgality.

## 2024-01-04 ENCOUNTER — APPOINTMENT (OUTPATIENT)
Dept: PAIN MANAGEMENT | Facility: CLINIC | Age: 59
End: 2024-01-04
Payer: COMMERCIAL

## 2024-01-04 VITALS
HEIGHT: 62 IN | DIASTOLIC BLOOD PRESSURE: 80 MMHG | HEART RATE: 75 BPM | OXYGEN SATURATION: 96 % | SYSTOLIC BLOOD PRESSURE: 128 MMHG

## 2024-01-04 PROCEDURE — 99214 OFFICE O/P EST MOD 30 MIN: CPT

## 2024-01-05 NOTE — HISTORY OF PRESENT ILLNESS
[Neck Pain] : neck pain [4] : a current pain level of 4/10 [2] : a maximum pain level of 2/10 [7] : 3. What number best describes how, during the past week, pain has interfered with your general activity? 7/10 pain [FreeTextEntry1] : Interval Note: Patient returns for follow-up today. Her pain in her neck is at baseline. Worst complaint is low back pain. Minimal radiation. Worse w/ activity. She remains active in Pilates and uses cupping.   On previous visit: "The fifth digit on her left hand goes numb. She does have pain in her trapezius. She has had injections in the past for her lumbar spine. She is going to physical therapy and has been active in pilates. Reports that she is feeling a bit better, pain is more episodic. There is subjective weakness when the pain is bad."   HPI Ms. MARJ OLSON is a 56 year F with PHx as below, referred by FRIEND who presents today with chief complaint of neck and bilateral upper extremity discomfort. Reports that it developed following a fall in 2003. It is located most prominently in the LEFT Cervical Spine;  there is radiation of the pain into the 5th digit left hand. The pain is presently 5/10 in severity on the numerical rating scale. It is tingling in nature. The pain is intermittent. There is diurnal worsening, during the night. The pain is aggravated with certain positions, rotation. The pain improves with rest, ibuprofen. The pain does impair the patient's ability to sleep.   Patient has no been seen by pain management in the past.  Seeing Dr Castillo for chronic migraine ( on emgality and nurtec) Patient has trialed Physical therapy, skelaxin with mild relief   Reports there is present numbness, there is no weakness. Patient denies any bowel/bladder incontinence, no saddle/perineal anesthesia or any other red flag signs or symptoms. Reports regular BMs.  Interventions: Left L4 and L5 TFESI (12/30):    [FreeTextEntry2] : 21

## 2024-01-05 NOTE — PHYSICAL EXAM
[de-identified] : Constitutional: Well-developed, in no acute distress\par  \par  Musculoskeletal:\par  Cervical Spine:   \par  Gait: Antalgic\par  Inspection: Normal curvature, no abnormal kyphosis or scoliosis\par  \par  Facet loading: pain bilaterally\par  \par  Palpation:\par  Cervical paraspinal muscles: pain bilaterally\par  		\par  Muscle Strength:\par  Deltoid: 5/5 bilaterally\par  Biceps: 5/5 bilaterally\par  Triceps: 5/5 bilaterally\par  Adductor pollicis: 5/5 bilaterally\par  \par  Sensation: normal and equal in bilateral upper extremities\par  \par  Extremity: no edema noted\par  Neurological: Memory normal, AAO x 3, Cranial nerves II - XII grossly normal\par  Psychiatric: Appropriate mood and affect, oriented to time, place, person, and situation\par

## 2024-01-09 ENCOUNTER — APPOINTMENT (OUTPATIENT)
Dept: PLASTIC SURGERY | Facility: CLINIC | Age: 59
End: 2024-01-09
Payer: SELF-PAY

## 2024-01-09 PROCEDURE — 64612 DESTROY NERVE FACE MUSCLE: CPT

## 2024-02-05 ENCOUNTER — APPOINTMENT (OUTPATIENT)
Dept: PLASTIC SURGERY | Facility: CLINIC | Age: 59
End: 2024-02-05
Payer: SELF-PAY

## 2024-02-05 PROCEDURE — 15789 CHEMICAL PEEL FACIAL DERMAL: CPT

## 2024-02-07 ENCOUNTER — APPOINTMENT (OUTPATIENT)
Dept: PAIN MANAGEMENT | Facility: CLINIC | Age: 59
End: 2024-02-07
Payer: COMMERCIAL

## 2024-02-07 PROCEDURE — 99214 OFFICE O/P EST MOD 30 MIN: CPT

## 2024-02-07 PROCEDURE — G2211 COMPLEX E/M VISIT ADD ON: CPT

## 2024-02-07 NOTE — REASON FOR VISIT
[Follow-Up Visit] : a follow-up pain management visit
Ears: no ear pain and no hearing problems.Nose: no nasal congestion and no nasal drainage.Mouth/Throat: no dysphagia, no hoarseness and no throat pain.Neck: no lumps, no pain, no stiffness and no swollen glands.

## 2024-02-07 NOTE — HISTORY OF PRESENT ILLNESS
[Neck Pain] : neck pain [4] : a current pain level of 4/10 [2] : a maximum pain level of 2/10 [7] : 3. What number best describes how, during the past week, pain has interfered with your general activity? 7/10 pain [FreeTextEntry1] : Verbal consent was given by/on: Sherita Olson on 02/07/24  Patient location: Home  Physician location: Office  Reason for Telehealth visit: Back pain  Pt reports that her back pain continues to be severe. Quality of life is impaired. There has been a severe exacerbation of the patient's chronic pain. Wishes for intervention at this time. Low back pain is her worst complaint.   This service took place using a two way audio and visual platform. The patient and Dr. Sanchez were both able to see each other and communicate through video. There were no barriers to communication. Greater then 50% of the time spent in the encounter involved counseling and coordination of care.   Time spent on visit: 30 minutes  Patient returns for follow-up today. Her pain in her neck is at baseline. Worst complaint is low back pain. Minimal radiation. Worse w/ activity. She remains active in Pilates and uses cupping.   On previous visit: "The fifth digit on her left hand goes numb. She does have pain in her trapezius. She has had injections in the past for her lumbar spine. She is going to physical therapy and has been active in pilates. Reports that she is feeling a bit better, pain is more episodic. There is subjective weakness when the pain is bad."   HPI Ms. SHERITA OLSON is a 56 year F with PHx as below, referred by FRIEND who presents today with chief complaint of neck and bilateral upper extremity discomfort. Reports that it developed following a fall in 2003. It is located most prominently in the LEFT Cervical Spine;  there is radiation of the pain into the 5th digit left hand. The pain is presently 5/10 in severity on the numerical rating scale. It is tingling in nature. The pain is intermittent. There is diurnal worsening, during the night. The pain is aggravated with certain positions, rotation. The pain improves with rest, ibuprofen. The pain does impair the patient's ability to sleep.   Patient has no been seen by pain management in the past.  Seeing Dr Castillo for chronic migraine ( on emgality and nurtec) Patient has trialed Physical therapy, skelaxin with mild relief   Reports there is present numbness, there is no weakness. Patient denies any bowel/bladder incontinence, no saddle/perineal anesthesia or any other red flag signs or symptoms. Reports regular BMs.  Interventions: Left L4 and L5 TFESI (12/30):    [FreeTextEntry2] : 21

## 2024-02-07 NOTE — ASSESSMENT
[FreeTextEntry1] : 58 yof w/ severe low back pain.  I have personally reviewed the patient's MRI in detail and discussed it with them which is significant for multiple levels of disc bulging.  I believe that she has facet-mediated pain as well as radicular pain from the cervical spine.  Recommend against MADDY at this time given her osteopenia and that symptoms are at baseline.  Continue her current exercise routine.  The patient has moderate to severe chronic axial back pain which has been present for at least three months and has failed to improve with conservative therapy. There is no untreated radiculopathy. There is no other known cause of axial back pain in this patient. The patient has failed to have relief with medication management and physical therapy. Given the patients failure to improve with all other conservative measures, recommend bilateral L3, L4, and L5 medial branch diagnostic block under fluoroscopic guidance. If the patient has significant relief of pain and improved quality of life following diagnostic block, will proceed to radiofrequency ablation.  I have discussed in detail with the patient that an interventional spine procedure is associated with potential risks. The procedure may include an injection of steroid and potentially other medications (local anesthetic and normal saline) into the epidural space or surrounding tissue of the spine. There are significant risks of this procedure which include and are not limited to infection, bleeding, worsening pain, dural puncture leading to post-dural puncture headache, nerve damage, spinal cord injury, paralysis, stroke, and death. There is a chance that the procedure does not improve their pain. There are risks associated with the steroid being absorbed into the body systemically. These include dysphoria, difficulty sleeping, mood swings, and personality changes. Pre-menopausal women may notice a regularity his in her menstrual cycle for 2-3 months following the injection. Steroids can specifically affect patients with hypertension, diabetes, and peptic ulcers. The procedure may cause a temporary increase in blood pressure and blood glucose, and may adversely affect a peptic ulcer. Other, more rare complications, including avascular necrosis of the joints, glaucoma, and osteoporosis. I have discussed the risks of the procedure at length with the patient, and the potential benefits of pain relief. I have offered alternatives to the procedure. All questions were answered. The patient expressed understanding and wishes to proceed with the procedure.  Physical therapy prescribed - goal will be to increase ROM, strengthening, postural training, other modalities ad jovan which may include massage and stim. Goals of therapy discussed with the patient in detail and will be discussed with physical therapist. Patient will follow-up following course of physical therapy to monitor progress and adjust therapy as needed.  Acetaminophen 1,000 mg q8h prn for moderate pain. Risks, benefits, and alternatives of acetaminophen discussed with patient.  Ibuprofen 600 mg q8h prn add when pain is not adequately controlled with acetaminophen. Risks, benefits, and alternatives of ibuprofen discussed with patient.  Based on the current evaluation and management, I will provide ongoing, longitudinal care for the complex painful condition described above. Patient is encouraged to reach out with any questions and/or concerns regarding their condition and care.  Diet and nutritional strategies discussed which may improve patients pain and will improve overall health.

## 2024-02-08 ENCOUNTER — TRANSCRIPTION ENCOUNTER (OUTPATIENT)
Age: 59
End: 2024-02-08

## 2024-02-08 RX ORDER — KETOROLAC TROMETHAMINE 10 MG/1
10 TABLET, FILM COATED ORAL
Qty: 20 | Refills: 0 | Status: ACTIVE | COMMUNITY
Start: 2021-06-25 | End: 1900-01-01

## 2024-02-08 NOTE — REASON FOR VISIT
[TextEntry] : Sherita came in for first microneedling. Skin prepped and cleansed first.  consent signed. pre and post instructions explained and given.

## 2024-03-07 ENCOUNTER — APPOINTMENT (OUTPATIENT)
Dept: PAIN MANAGEMENT | Facility: HOSPITAL | Age: 59
End: 2024-03-07

## 2024-03-07 ENCOUNTER — TRANSCRIPTION ENCOUNTER (OUTPATIENT)
Age: 59
End: 2024-03-07

## 2024-03-07 DIAGNOSIS — G43.E09 CHRONIC MIGRAINE WITH AURA, NOT INTRACTABLE, WITHOUT STATUS MIGRAINOSUS: ICD-10-CM

## 2024-03-26 ENCOUNTER — TRANSCRIPTION ENCOUNTER (OUTPATIENT)
Age: 59
End: 2024-03-26

## 2024-04-03 ENCOUNTER — APPOINTMENT (OUTPATIENT)
Dept: PAIN MANAGEMENT | Facility: CLINIC | Age: 59
End: 2024-04-03
Payer: COMMERCIAL

## 2024-04-03 DIAGNOSIS — M47.812 SPONDYLOSIS W/OUT MYELOPATHY OR RADICULOPATHY, CERVICAL REGION: ICD-10-CM

## 2024-04-03 DIAGNOSIS — M54.12 RADICULOPATHY, CERVICAL REGION: ICD-10-CM

## 2024-04-03 DIAGNOSIS — M79.10 MYALGIA, UNSPECIFIED SITE: ICD-10-CM

## 2024-04-03 DIAGNOSIS — M54.16 RADICULOPATHY, LUMBAR REGION: ICD-10-CM

## 2024-04-03 DIAGNOSIS — G89.4 CHRONIC PAIN SYNDROME: ICD-10-CM

## 2024-04-03 PROCEDURE — 99214 OFFICE O/P EST MOD 30 MIN: CPT

## 2024-04-03 NOTE — PHYSICAL EXAM
[de-identified] : Constitutional: Well-developed, in no acute distress  Psychiatric: Appropriate mood and affect, oriented to time, place, person, and situation

## 2024-04-03 NOTE — DATA REVIEWED
[FreeTextEntry1] : PROCEDURE: MRI CERVICAL SPINE    There is maintenance of the normal cervical lordosis.    The prevertebral soft tissues appear within range of normal.    The craniocervical junction and clivus and C1-C2 distance appear within range   of normal    There is uniform marrow signal on all sequences    The cervical cord is uniform in signal intensity without evidence of syrinx or   suggestion of myelomalacia.    At C2-C3 , there is no significant central or foraminal stenosis.    At C3-C4 there is no significant central or foraminal stenosis.    At C4-C5, there is a small midline disc protrusion indenting the thecal sac   without significant cord deformity. A tiny cranially extruded disc fragment   may be present best seen on sagittal image 8 series 4.    At C5-C6 , there is broad-based disc osteophyte complex and bilateral   uncovertebral joint spurring which contributes to multifactorial central canal   stenosis reducing the AP dimension of the thecal sac to 7.5 mm and mild to   moderate bilateral foraminal narrowing.    At C6-C7 there is broad-based disc osteophyte complex and bilateral   uncovertebral joint spurring. Central disc bulge reduces the AP dimension of   the thecal sac to 9 mm. There is mild bilateral foraminal stenosis.    At C7-T1 , there is a shallow central disc bulge unchanged from prior exam.    At T1-T2 there is a central disc protrusion indenting the thecal sac without   cord deformity    IMPRESSION:   Multilevel degenerative disc disease as outlined above contributes to mild   central canal stenosis at C5-C6 and C6-C7.    Tiny midline disc protrusion with minimal cranial extrusion at C4-C5    Other findings outlined above    Other findings discussed above

## 2024-04-03 NOTE — REVIEW OF SYSTEMS
[Muscle Pain] : muscle pain [Neck Pain] : neck pain [Joint Stiffness] : joint stiffness [Negative] : Psychiatric

## 2024-04-03 NOTE — HISTORY OF PRESENT ILLNESS
[Neck Pain] : neck pain [4] : a current pain level of 4/10 [2] : a maximum pain level of 2/10 [7] : 3. What number best describes how, during the past week, pain has interfered with your general activity? 7/10 pain [FreeTextEntry1] : Verbal consent was given by/on: Sherita Olson on 04/03/24  Patient location: Home  Physician location: Office  Reason for Telehealth visit: Back pain  Pt reports that her back pain continues to be severe. Quality of life is impaired. There has been a severe exacerbation of the patient's chronic pain. Low back pain is her worst complaint. She reports about one month of some relief from her medial branch block.  This service took place using a two way audio and visual platform. The patient and Dr. Sanchez were both able to see each other and communicate through video. There were no barriers to communication. Greater then 50% of the time spent in the encounter involved counseling and coordination of care.   Time spent on visit: 30 minutes  Patient returns for follow-up today. Her pain in her neck is at baseline. Worst complaint is low back pain. Minimal radiation. Worse w/ activity. She remains active in Pilates and uses cupping.   On previous visit: "The fifth digit on her left hand goes numb. She does have pain in her trapezius. She has had injections in the past for her lumbar spine. She is going to physical therapy and has been active in pilates. Reports that she is feeling a bit better, pain is more episodic. There is subjective weakness when the pain is bad."   HPI Ms. SHERITA OLSON is a 56 year F with PHx as below, referred by FRIEND who presents today with chief complaint of neck and bilateral upper extremity discomfort. Reports that it developed following a fall in 2003. It is located most prominently in the LEFT Cervical Spine;  there is radiation of the pain into the 5th digit left hand. The pain is presently 5/10 in severity on the numerical rating scale. It is tingling in nature. The pain is intermittent. There is diurnal worsening, during the night. The pain is aggravated with certain positions, rotation. The pain improves with rest, ibuprofen. The pain does impair the patient's ability to sleep.   Patient has no been seen by pain management in the past.  Seeing Dr Castillo for chronic migraine ( on emgality and nurtec) Patient has trialed Physical therapy, skelaxin with mild relief   Reports there is present numbness, there is no weakness. Patient denies any bowel/bladder incontinence, no saddle/perineal anesthesia or any other red flag signs or symptoms. Reports regular BMs.  Interventions: Left L4 and L5 TFESI (12/30):    [FreeTextEntry2] : 21

## 2024-04-03 NOTE — ASSESSMENT
[FreeTextEntry1] : 58 yof w/ severe low back pain s/p lumbar medial branch block. She did have relief from the block but pain persists currently.  I have personally reviewed the patient's MRI in detail and discussed it with them which is significant for multiple levels of disc bulging.  I believe that she has facet-mediated pain as well as radicular pain from the cervical spine.  Would consider MADDY if her pain becomes severe. Ultimately, I do believe that she is a surgical candidate, however, as she maintains a high degree of activity best to pursue conservative measures at this time.   Continue her current exercise routine.  Physical therapy prescribed - goal will be to increase ROM, strengthening, postural training, other modalities ad jovan which may include massage and stim. Goals of therapy discussed with the patient in detail and will be discussed with physical therapist. Patient will follow-up following course of physical therapy to monitor progress and adjust therapy as needed.  Acetaminophen 1,000 mg q8h prn for moderate pain. Risks, benefits, and alternatives of acetaminophen discussed with patient.  Ibuprofen 600 mg q8h prn add when pain is not adequately controlled with acetaminophen. Risks, benefits, and alternatives of ibuprofen discussed with patient.  Based on the current evaluation and management, I will provide ongoing, longitudinal care for the complex painful condition described above. Patient is encouraged to reach out with any questions and/or concerns regarding their condition and care.  Diet and nutritional strategies discussed which may improve patients pain and will improve overall health.  I explained to patient benefits and limitation of TeleMedicine visits. Patient understands that limitations include inability to perform comprehensive physical exam, which may lead to potential diagnostic inconsistencies.  Patient understands that diagnosis and treatment may be limited by these inconsistencies and patient agrees to proceed with care plan

## 2024-04-11 ENCOUNTER — APPOINTMENT (OUTPATIENT)
Dept: PLASTIC SURGERY | Facility: CLINIC | Age: 59
End: 2024-04-11
Payer: SELF-PAY

## 2024-04-11 DIAGNOSIS — Z41.1 ENCOUNTER FOR COSMETIC SURGERY: ICD-10-CM

## 2024-04-11 PROCEDURE — 64612 DESTROY NERVE FACE MUSCLE: CPT

## 2024-04-11 PROCEDURE — 11950J JUVEDERM: CUSTOM

## 2024-04-11 NOTE — PROCEDURE
[FreeTextEntry1] : Prominent glabellar frown lines  [FreeTextEntry2] : Botox 20 units to glabellar frown lines  [FreeTextEntry3] : Quadricaine topical  [FreeTextEntry4] : minimal  [FreeTextEntry5] : none  [FreeTextEntry6] : Following pre treatment with Quadricaine, 20 units Botox injected in glabellar frown lines. Patient tolerated well, instructions reviewed.   [FreeTextEntry7] : none

## 2024-04-11 NOTE — ASSESSMENT
[FreeTextEntry1] : A: Prominent glabellar frown lines  P: Botox 20 units to glabellar frown lines. Tolerated well, instructions reviewed.

## 2024-04-15 ENCOUNTER — APPOINTMENT (OUTPATIENT)
Dept: PAIN MANAGEMENT | Facility: CLINIC | Age: 59
End: 2024-04-15
Payer: COMMERCIAL

## 2024-04-15 VITALS
SYSTOLIC BLOOD PRESSURE: 128 MMHG | DIASTOLIC BLOOD PRESSURE: 82 MMHG | HEIGHT: 62 IN | HEART RATE: 67 BPM | BODY MASS INDEX: 29.08 KG/M2 | WEIGHT: 158 LBS | OXYGEN SATURATION: 95 %

## 2024-04-15 DIAGNOSIS — M54.51 VERTEBROGENIC LOW BACK PAIN: ICD-10-CM

## 2024-04-15 PROCEDURE — 99213 OFFICE O/P EST LOW 20 MIN: CPT

## 2024-04-15 NOTE — HISTORY OF PRESENT ILLNESS
[FreeTextEntry1] : Interval Note:  March 7, 2024 patient underwent left L3-L4-L5 diagnostic medial branch block with 80 % relief (pain went from 9/10 to 1/100 for over 1 week  Pain in left lumbar region has since returned  Patient denies any bowel/bladder incontinence, no saddle/perineal anesthesia or any other red flag signs or symptoms.  --  Had Left L4 and L5 transforaminal epidural steroid injection on 12/30 with excellent (80%) relief  ---  HPI Ms. MARJ OLSON is a 56 year F with PHx as below, referred by FRIEND who presents today with chief complaint of neck and bilateral upper extremity discomfort. Reports that it developed following a fall in 2003. It is located most prominently in the LEFT Cervical Spine;  there is radiation of the pain into the 5th digit left hand. The pain is presently 5/10 in severity on the numerical rating scale. It is tingling in nature. The pain is intermittent. There is diurnal worsening, during the night. The pain is aggravated with certain positions, rotation. The pain improves with rest, ibuprofen. The pain does impair the patient's ability to sleep.   Patient has no been seen by pain management in the past.  Seeing Dr Castillo for chronic migraine ( on emgality and nurtec) Patient has trialed Physical therapy, skelaxin with mild relief   Reports there is present numbness, there is no weakness. Patient denies any bowel/bladder incontinence, no saddle/perineal anesthesia or any other red flag signs or symptoms. Reports regular BMs.

## 2024-04-15 NOTE — DATA REVIEWED
[FreeTextEntry1] : PROCEDURE: MRI LUMBAR SPINE  ORDER #: [id number] CC: ; ; ; End of cc's  HISTORY: 56 years Female [id number] MRI   PROCEDURE:MRI lumbar spine without contrast  COMPARISON:August 12, 2016  TECHNIQUE:MRI lumbosacral spine 1.5 Shefali magnet  FINDINGS:  The paraspinal musculature including the psoas muscle, multifidus muscles, and immediate para axial soft tissues appear within range of normal without evidence of mass or collection.  There is maintenance of a lumbar lordosis. Anterior subluxation of L4 with respect to L5 is grade 1 in severity but has progressed since the 2016 study now measuring up to 4 mm.  There is fairly uniform marrow signal on all sequences.  The spinal canal is patent without detectable intradural or extradural mass or collection.  At L5-S1 There is loss of intervertebral disc space height. No focal disc protrusion is evident.  At L4-L5 Anterior subluxation of L4 with respect to L5 results in uncovering of the disc and resultant circumferential disc bulging. There is multifactorial moderately severe central canal stenosis present partially secondary to uncovering of the intervertebral disc and anterior subluxation of L4 and partially secondary to bony overgrowth of the facet joints and thickening of the ligamentum flavum. There is effacement of the lateral recesses. Mild bilateral foraminal narrowing is present.  At L3-L4 There is loss of intervertebral disc space height and circumferential disc bulging which contributes to multifactorial mild central canal stenosis and effacing the lateral recesses on the left more so than the right. No significant foraminal stenosis is present despite a small left foraminal disc protrusion at L3-L4.  At L2-L3 There is loss of intervertebral disc space height and circumferential disc bulging which has progressed significantly since the prior exam. Effacement of the lateral recesses is noted. Bilateral intraforaminal disc protrusion are present which to not impinge upon the exiting nerve roots  At L1-L2 The disc appears intact. There is no significant central or foraminal stenosis.    IMPRESSION:  Progressive degenerative changes of the lumbar spine as outlined above include but are not limited to progressive anterior subluxation of L4 with respect to L5 and associated multifactorial central canal stenosis moderately severe in degree.  Other pertinent findings described above also demonstrated progressive degenerative disc disease as compared to the August 12, 2016 study

## 2024-04-15 NOTE — PHYSICAL EXAM
[de-identified] : General: AAOx3, NAD HEENT: EOMI, Sclera white CVS: +2 Pulses Pulmonary: No Respiratory Distress Abdomen: Soft, NT, ND MSK: Moving all extremities against gravity Neuro: Sensation I to LT Extremities: (-)Edema Derm: No Rash Psych: Calm, Cooperative

## 2024-04-15 NOTE — ASSESSMENT
[FreeTextEntry1] : Ms. MARJ OLSON is a 56 year F suffering from left sided low back pain likely secondary to lumbar spondylosis  >> Medications  Chronic opioid use for non-malignant pain, in particular at high doses would not be recommended since it can potentially lead to hyperalgesia (hypersensitivity), tolerance and addiction.     Cw Ibuprofen, skelaxin as needed  >> Interventions   Patient is an appropriate candidate for REPEAT LEFT L3, L4, L5 diagnostic medial branch blocks under fluoroscopic guidance. If only short term pain is achieved with facet blocks, we will consider Radiofrequency denervation of facet joints. Procedure discussed with patient including success rate, side effects and complications.  >> Therapy and Other Modalities  Continue with daily home stretching regimen    >> Imaging and Other Studies   I have personally reviewed the images in detail together with the patient today, and I have answered all questions regarding this condition to the best of my ability, to the patient's satisfaction.     >> Consults  None ordered

## 2024-05-02 ENCOUNTER — TRANSCRIPTION ENCOUNTER (OUTPATIENT)
Age: 59
End: 2024-05-02

## 2024-05-06 ENCOUNTER — TRANSCRIPTION ENCOUNTER (OUTPATIENT)
Age: 59
End: 2024-05-06

## 2024-05-08 ENCOUNTER — NON-APPOINTMENT (OUTPATIENT)
Age: 59
End: 2024-05-08

## 2024-05-08 ENCOUNTER — TRANSCRIPTION ENCOUNTER (OUTPATIENT)
Age: 59
End: 2024-05-08

## 2024-05-08 ENCOUNTER — OUTPATIENT (OUTPATIENT)
Dept: OUTPATIENT SERVICES | Facility: HOSPITAL | Age: 59
LOS: 1 days | End: 2024-05-08

## 2024-05-08 ENCOUNTER — APPOINTMENT (OUTPATIENT)
Dept: PAIN MANAGEMENT | Facility: HOSPITAL | Age: 59
End: 2024-05-08

## 2024-05-08 ENCOUNTER — APPOINTMENT (OUTPATIENT)
Dept: INTERNAL MEDICINE | Facility: CLINIC | Age: 59
End: 2024-05-08

## 2024-05-15 RX ORDER — RIMEGEPANT SULFATE 75 MG/75MG
75 TABLET, ORALLY DISINTEGRATING ORAL
Qty: 8 | Refills: 10 | Status: ACTIVE | COMMUNITY
Start: 2020-06-12 | End: 1900-01-01

## 2024-05-15 RX ORDER — GALCANEZUMAB 120 MG/ML
120 INJECTION, SOLUTION SUBCUTANEOUS
Qty: 120 | Refills: 11 | Status: ACTIVE | COMMUNITY
Start: 2020-01-31 | End: 1900-01-01

## 2024-05-16 ENCOUNTER — APPOINTMENT (OUTPATIENT)
Dept: BREAST CENTER | Facility: CLINIC | Age: 59
End: 2024-05-16

## 2024-05-22 ENCOUNTER — APPOINTMENT (OUTPATIENT)
Dept: PAIN MANAGEMENT | Facility: CLINIC | Age: 59
End: 2024-05-22
Payer: COMMERCIAL

## 2024-05-22 VITALS
DIASTOLIC BLOOD PRESSURE: 82 MMHG | BODY MASS INDEX: 29.08 KG/M2 | OXYGEN SATURATION: 100 % | HEART RATE: 67 BPM | HEIGHT: 62 IN | SYSTOLIC BLOOD PRESSURE: 143 MMHG | WEIGHT: 158 LBS

## 2024-05-22 DIAGNOSIS — M47.816 SPONDYLOSIS W/OUT MYELOPATHY OR RADICULOPATHY, LUMBAR REGION: ICD-10-CM

## 2024-05-22 PROCEDURE — 99214 OFFICE O/P EST MOD 30 MIN: CPT

## 2024-05-22 NOTE — ASSESSMENT
[FreeTextEntry1] : Ms. MARJ OLSON is a 56 year F suffering from neck and left arm pain into the left pinky likely related to disc protrusion at c7/t1 and associated cervical radiculopathy  >> Medications  Chronic opioid use for non-malignant pain, in particular at high doses would not be recommended since it can potentially lead to hyperalgesia (hypersensitivity), tolerance and addiction.    CHANGE TO Pregabalin 25 mg po BID   Cw Ibuprofen, skelaxin as needed  >> Interventions   Patient is an appropriate candidate for LEFT  L3-L4-L5 Radiofrequency ablation of facet joints. Procedure discussed with patient including success rate, side effects and complications.-Patient will call after doing more research  >> Therapy and Other Modalities   Cw PT / Acupuncture  Continue with daily home stretching regimen    >> Imaging and Other Studies   I have personally reviewed the images in detail together with the patient today, and I have answered all questions regarding this condition to the best of my ability, to the patient's satisfaction.     >> Consults  None ordered

## 2024-05-22 NOTE — HISTORY OF PRESENT ILLNESS
[FreeTextEntry1] : Interval Note:  On May 8th, 2024 patient underwent left L3-L4-L5 diagnostic medial branch block with 80 % relief (pain went from 8/10 to 0/10)   Pain in left lumbar region has since returned  Patient denies any bowel/bladder incontinence, no saddle/perineal anesthesia or any other red flag signs or symptoms.   -- March 7, 2024 patient underwent left L3-L4-L5 diagnostic medial branch block with 80 % relief (pain went from 9/10 to 1/10 for over 1 week)  Had Left L4 and L5 transforaminal epidural steroid injection on 12/30 with excellent (80%) relief  ---  HPI Ms. MARJ OLSON is a 56 year F with PHx as below, referred by FRIEND who presents today with chief complaint of neck and bilateral upper extremity discomfort. Reports that it developed following a fall in 2003. It is located most prominently in the LEFT Cervical Spine;  there is radiation of the pain into the 5th digit left hand. The pain is presently 5/10 in severity on the numerical rating scale. It is tingling in nature. The pain is intermittent. There is diurnal worsening, during the night. The pain is aggravated with certain positions, rotation. The pain improves with rest, ibuprofen. The pain does impair the patient's ability to sleep.   Patient has no been seen by pain management in the past.  Seeing Dr Castillo for chronic migraine ( on emgality and nurtec) Patient has trialed Physical therapy, skelaxin with mild relief   Reports there is present numbness, there is no weakness. Patient denies any bowel/bladder incontinence, no saddle/perineal anesthesia or any other red flag signs or symptoms. Reports regular BMs.

## 2024-05-22 NOTE — PHYSICAL EXAM
[de-identified] : General: AAOx3, NAD HEENT: EOMI, Sclera white CVS: +2 Pulses Pulmonary: No Respiratory Distress Abdomen: Soft, NT, ND MSK: Moving all extremities against gravity ++ Pain with truncal extension Neuro: Sensation I to LT Extremities: (-)Edema Derm: No Rash Psych: Calm, Cooperative

## 2024-06-20 ENCOUNTER — NON-APPOINTMENT (OUTPATIENT)
Age: 59
End: 2024-06-20

## 2024-07-08 ENCOUNTER — TRANSCRIPTION ENCOUNTER (OUTPATIENT)
Age: 59
End: 2024-07-08

## 2024-08-14 ENCOUNTER — APPOINTMENT (OUTPATIENT)
Dept: PLASTIC SURGERY | Facility: CLINIC | Age: 59
End: 2024-08-14
Payer: SELF-PAY

## 2024-08-14 DIAGNOSIS — Z41.1 ENCOUNTER FOR COSMETIC SURGERY: ICD-10-CM

## 2024-08-14 PROCEDURE — 11950 SUBQ NJX FILLING MATRL 1CC/<: CPT

## 2024-08-14 PROCEDURE — 64612 DESTROY NERVE FACE MUSCLE: CPT

## 2024-08-14 NOTE — PROCEDURE
[FreeTextEntry1] : Prominent glabellar frown lines and volume loss in the midface [FreeTextEntry2] : Botox to glabella and Voluma to midface

## 2024-08-14 NOTE — ASSESSMENT
[FreeTextEntry1] : A: Prominent glabellar frown lines and volume loss in the midface P: Botox to glabella and Voluma to midface. Tolerated well, instructions reviewed.

## 2024-08-14 NOTE — REASON FOR VISIT
[Procedure: _________] : a [unfilled] procedure visit [FreeTextEntry1] : Patient returns with prominent glabellar frown lines and volume loss in the midface for injectable treatment

## 2024-09-03 ENCOUNTER — TRANSCRIPTION ENCOUNTER (OUTPATIENT)
Age: 59
End: 2024-09-03

## 2024-09-04 ENCOUNTER — TRANSCRIPTION ENCOUNTER (OUTPATIENT)
Age: 59
End: 2024-09-04

## 2024-09-05 ENCOUNTER — APPOINTMENT (OUTPATIENT)
Dept: NUTRITION | Facility: CLINIC | Age: 59
End: 2024-09-05
Payer: COMMERCIAL

## 2024-09-05 PROCEDURE — 97802 MEDICAL NUTRITION INDIV IN: CPT | Mod: 95

## 2024-09-08 ENCOUNTER — RESULT REVIEW (OUTPATIENT)
Age: 59
End: 2024-09-08

## 2024-09-10 ENCOUNTER — APPOINTMENT (OUTPATIENT)
Dept: BREAST CENTER | Facility: CLINIC | Age: 59
End: 2024-09-10
Payer: COMMERCIAL

## 2024-09-10 VITALS
SYSTOLIC BLOOD PRESSURE: 141 MMHG | WEIGHT: 155 LBS | BODY MASS INDEX: 28.52 KG/M2 | HEART RATE: 84 BPM | HEIGHT: 62 IN | DIASTOLIC BLOOD PRESSURE: 85 MMHG

## 2024-09-10 DIAGNOSIS — U07.1 COVID-19: ICD-10-CM

## 2024-09-10 DIAGNOSIS — D05.00 LOBULAR CARCINOMA IN SITU OF UNSPECIFIED BREAST: ICD-10-CM

## 2024-09-10 DIAGNOSIS — N60.19 DIFFUSE CYSTIC MASTOPATHY OF UNSPECIFIED BREAST: ICD-10-CM

## 2024-09-10 DIAGNOSIS — E01.0 IODINE-DEFICIENCY RELATED DIFFUSE (ENDEMIC) GOITER: ICD-10-CM

## 2024-09-10 DIAGNOSIS — Z80.3 FAMILY HISTORY OF MALIGNANT NEOPLASM OF BREAST: ICD-10-CM

## 2024-09-10 DIAGNOSIS — Z91.041 RADIOGRAPHIC DYE ALLERGY STATUS: ICD-10-CM

## 2024-09-10 DIAGNOSIS — N60.91 UNSPECIFIED BENIGN MAMMARY DYSPLASIA OF RIGHT BREAST: ICD-10-CM

## 2024-09-10 PROCEDURE — 99213 OFFICE O/P EST LOW 20 MIN: CPT

## 2024-09-10 NOTE — HISTORY OF PRESENT ILLNESS
[FreeTextEntry1] : S/P R Stereot Bx (7/23/09): +LCIS/ALH S/P R BR Bx (1997)([facility]): FA S/P R Br Bx w/NL (9/11/09): +ALH S/P tmx/ASAb x 5yrs +FH Br Ca (Mother 67 (BRCA-)) +Ashkenazi ancestry BRCA ([facility]) (11/24/09): - S/P lap BSO (12/13)([facility]): Benign cyst Colonoscopy(?2015): "WNL" PAP/Pelvic (3/22): "WNL"  Got COVID vaccine (10/23) > had sig rxn x sev wks  +h/o COVID (3/19, 12/22) > recovered No other MH/FH changes. ROS reviewed/discussed. Taking Ca/Vit D. Last Bone Densitometry (5/19/14): +osteopenia Mammo/Sono (9/9/24): FG, NSF MRI (2/4/19): NSF > Pt reluctant to have another MRI s/t allergic rxn despite steroids

## 2024-09-10 NOTE — PHYSICAL EXAM
[Normocephalic] : normocephalic [Atraumatic] : atraumatic [Supple] : supple [No Supraclavicular Adenopathy] : no supraclavicular adenopathy [No Cervical Adenopathy] : no cervical adenopathy [Normal Sinus Rhythm] : normal sinus rhythm [Examined in the supine and seated position] : examined in the supine and seated position [No dominant masses] : no dominant masses in right breast  [No dominant masses] : no dominant masses left breast [No Nipple Retraction] : no left nipple retraction [No Nipple Discharge] : no left nipple discharge [No Axillary Lymphadenopathy] : no left axillary lymphadenopathy [No Edema] : no edema [No Rashes] : no rashes [No Ulceration] : no ulceration [de-identified] : +thyromegaly > old > followed [de-identified] : +FC tissue NSF  [de-identified] : +FC tissue NSF

## 2024-10-03 ENCOUNTER — APPOINTMENT (OUTPATIENT)
Dept: PLASTIC SURGERY | Facility: CLINIC | Age: 59
End: 2024-10-03
Payer: SELF-PAY

## 2024-10-03 PROCEDURE — MS014: CPT

## 2024-10-08 NOTE — REASON FOR VISIT
[TextEntry] : Sherita came in for VI peel pres. plus treatment. Will return in 1 month for a second peel. We will also go over pigment correcting program kit. consent signed. skin prepped and cleansed. pre peel applied. peel applied.

## 2024-10-17 ENCOUNTER — TRANSCRIPTION ENCOUNTER (OUTPATIENT)
Age: 59
End: 2024-10-17

## 2024-10-21 ENCOUNTER — TRANSCRIPTION ENCOUNTER (OUTPATIENT)
Age: 59
End: 2024-10-21

## 2024-11-05 ENCOUNTER — APPOINTMENT (OUTPATIENT)
Dept: PLASTIC SURGERY | Facility: CLINIC | Age: 59
End: 2024-11-05

## 2024-11-05 PROCEDURE — MS014: CPT

## 2024-11-20 ENCOUNTER — APPOINTMENT (OUTPATIENT)
Dept: PLASTIC SURGERY | Facility: CLINIC | Age: 59
End: 2024-11-20
Payer: SELF-PAY

## 2024-11-20 DIAGNOSIS — Z41.1 ENCOUNTER FOR COSMETIC SURGERY: ICD-10-CM

## 2024-11-20 PROCEDURE — 64612 DESTROY NERVE FACE MUSCLE: CPT

## 2024-12-16 ENCOUNTER — APPOINTMENT (OUTPATIENT)
Dept: NEUROLOGY | Facility: CLINIC | Age: 59
End: 2024-12-16

## 2025-01-02 ENCOUNTER — NON-APPOINTMENT (OUTPATIENT)
Age: 60
End: 2025-01-02

## 2025-01-02 ENCOUNTER — APPOINTMENT (OUTPATIENT)
Dept: NEUROLOGY | Facility: CLINIC | Age: 60
End: 2025-01-02
Payer: COMMERCIAL

## 2025-01-02 ENCOUNTER — APPOINTMENT (OUTPATIENT)
Dept: PLASTIC SURGERY | Facility: CLINIC | Age: 60
End: 2025-01-02

## 2025-01-02 VITALS
SYSTOLIC BLOOD PRESSURE: 115 MMHG | BODY MASS INDEX: 28.35 KG/M2 | OXYGEN SATURATION: 99 % | DIASTOLIC BLOOD PRESSURE: 76 MMHG | HEART RATE: 60 BPM | WEIGHT: 155 LBS

## 2025-01-02 DIAGNOSIS — M54.12 RADICULOPATHY, CERVICAL REGION: ICD-10-CM

## 2025-01-02 DIAGNOSIS — G43.E09 CHRONIC MIGRAINE WITH AURA, NOT INTRACTABLE, WITHOUT STATUS MIGRAINOSUS: ICD-10-CM

## 2025-01-02 PROCEDURE — 99214 OFFICE O/P EST MOD 30 MIN: CPT

## 2025-01-02 RX ORDER — TIZANIDINE 2 MG/1
2 TABLET ORAL
Qty: 60 | Refills: 1 | Status: ACTIVE | COMMUNITY
Start: 2025-01-02 | End: 1900-01-01

## 2025-01-02 RX ORDER — FAMOTIDINE 40 MG/1
40 TABLET, FILM COATED ORAL
Refills: 0 | Status: ACTIVE | COMMUNITY

## 2025-01-14 ENCOUNTER — TRANSCRIPTION ENCOUNTER (OUTPATIENT)
Age: 60
End: 2025-01-14

## 2025-03-17 ENCOUNTER — TRANSCRIPTION ENCOUNTER (OUTPATIENT)
Age: 60
End: 2025-03-17

## 2025-03-17 ENCOUNTER — APPOINTMENT (OUTPATIENT)
Dept: PAIN MANAGEMENT | Facility: CLINIC | Age: 60
End: 2025-03-17
Payer: COMMERCIAL

## 2025-03-17 VITALS
BODY MASS INDEX: 27.42 KG/M2 | HEIGHT: 62 IN | SYSTOLIC BLOOD PRESSURE: 137 MMHG | HEART RATE: 105 BPM | WEIGHT: 149 LBS | OXYGEN SATURATION: 99 % | DIASTOLIC BLOOD PRESSURE: 69 MMHG

## 2025-03-17 DIAGNOSIS — M54.16 RADICULOPATHY, LUMBAR REGION: ICD-10-CM

## 2025-03-17 PROCEDURE — 99213 OFFICE O/P EST LOW 20 MIN: CPT

## 2025-03-20 ENCOUNTER — APPOINTMENT (OUTPATIENT)
Dept: PLASTIC SURGERY | Facility: CLINIC | Age: 60
End: 2025-03-20
Payer: SELF-PAY

## 2025-03-20 DIAGNOSIS — Z41.1 ENCOUNTER FOR COSMETIC SURGERY: ICD-10-CM

## 2025-03-20 PROCEDURE — 64612 DESTROY NERVE FACE MUSCLE: CPT

## 2025-03-24 ENCOUNTER — RESULT REVIEW (OUTPATIENT)
Age: 60
End: 2025-03-24

## 2025-03-31 ENCOUNTER — APPOINTMENT (OUTPATIENT)
Dept: PAIN MANAGEMENT | Facility: CLINIC | Age: 60
End: 2025-03-31
Payer: COMMERCIAL

## 2025-03-31 VITALS
BODY MASS INDEX: 27.42 KG/M2 | OXYGEN SATURATION: 100 % | SYSTOLIC BLOOD PRESSURE: 152 MMHG | WEIGHT: 149 LBS | HEIGHT: 62 IN | DIASTOLIC BLOOD PRESSURE: 86 MMHG | HEART RATE: 61 BPM

## 2025-03-31 DIAGNOSIS — M54.16 RADICULOPATHY, LUMBAR REGION: ICD-10-CM

## 2025-03-31 PROCEDURE — 99213 OFFICE O/P EST LOW 20 MIN: CPT

## 2025-04-09 ENCOUNTER — TRANSCRIPTION ENCOUNTER (OUTPATIENT)
Age: 60
End: 2025-04-09

## 2025-04-09 ENCOUNTER — APPOINTMENT (OUTPATIENT)
Dept: PAIN MANAGEMENT | Facility: HOSPITAL | Age: 60
End: 2025-04-09

## 2025-05-21 ENCOUNTER — NON-APPOINTMENT (OUTPATIENT)
Age: 60
End: 2025-05-21

## 2025-05-22 ENCOUNTER — APPOINTMENT (OUTPATIENT)
Dept: PLASTIC SURGERY | Facility: CLINIC | Age: 60
End: 2025-05-22

## 2025-05-22 PROCEDURE — MS002: CPT

## 2025-07-30 ENCOUNTER — APPOINTMENT (OUTPATIENT)
Dept: PLASTIC SURGERY | Facility: CLINIC | Age: 60
End: 2025-07-30
Payer: SELF-PAY

## 2025-07-30 DIAGNOSIS — Z41.1 ENCOUNTER FOR COSMETIC SURGERY: ICD-10-CM

## 2025-08-19 ENCOUNTER — NON-APPOINTMENT (OUTPATIENT)
Age: 60
End: 2025-08-19

## 2025-08-20 ENCOUNTER — APPOINTMENT (OUTPATIENT)
Dept: PLASTIC SURGERY | Facility: CLINIC | Age: 60
End: 2025-08-20
Payer: SELF-PAY

## 2025-08-20 PROCEDURE — MS002: CPT

## 2025-09-02 ENCOUNTER — APPOINTMENT (OUTPATIENT)
Dept: NEUROLOGY | Facility: CLINIC | Age: 60
End: 2025-09-02
Payer: COMMERCIAL

## 2025-09-02 ENCOUNTER — NON-APPOINTMENT (OUTPATIENT)
Age: 60
End: 2025-09-02

## 2025-09-02 VITALS
DIASTOLIC BLOOD PRESSURE: 82 MMHG | HEART RATE: 60 BPM | BODY MASS INDEX: 27.62 KG/M2 | WEIGHT: 151 LBS | OXYGEN SATURATION: 100 % | SYSTOLIC BLOOD PRESSURE: 149 MMHG

## 2025-09-02 DIAGNOSIS — M54.12 RADICULOPATHY, CERVICAL REGION: ICD-10-CM

## 2025-09-02 DIAGNOSIS — M62.838 OTHER MUSCLE SPASM: ICD-10-CM

## 2025-09-02 DIAGNOSIS — G89.29 CERVICALGIA: ICD-10-CM

## 2025-09-02 DIAGNOSIS — G44.86 CERVICOGENIC HEADACHE: ICD-10-CM

## 2025-09-02 DIAGNOSIS — M54.2 CERVICALGIA: ICD-10-CM

## 2025-09-02 DIAGNOSIS — G43.E09 CHRONIC MIGRAINE WITH AURA, NOT INTRACTABLE, WITHOUT STATUS MIGRAINOSUS: ICD-10-CM

## 2025-09-02 PROCEDURE — 99215 OFFICE O/P EST HI 40 MIN: CPT

## 2025-09-05 ENCOUNTER — RESULT REVIEW (OUTPATIENT)
Age: 60
End: 2025-09-05

## 2025-09-09 ENCOUNTER — APPOINTMENT (OUTPATIENT)
Dept: BREAST CENTER | Facility: CLINIC | Age: 60
End: 2025-09-09
Payer: COMMERCIAL

## 2025-09-09 VITALS
HEART RATE: 62 BPM | WEIGHT: 150 LBS | HEIGHT: 62 IN | SYSTOLIC BLOOD PRESSURE: 148 MMHG | BODY MASS INDEX: 27.6 KG/M2 | DIASTOLIC BLOOD PRESSURE: 80 MMHG

## 2025-09-09 DIAGNOSIS — U07.1 COVID-19: ICD-10-CM

## 2025-09-09 DIAGNOSIS — D05.00 LOBULAR CARCINOMA IN SITU OF UNSPECIFIED BREAST: ICD-10-CM

## 2025-09-09 DIAGNOSIS — E01.0 IODINE-DEFICIENCY RELATED DIFFUSE (ENDEMIC) GOITER: ICD-10-CM

## 2025-09-09 DIAGNOSIS — N60.19 DIFFUSE CYSTIC MASTOPATHY OF UNSPECIFIED BREAST: ICD-10-CM

## 2025-09-09 DIAGNOSIS — N60.91 UNSPECIFIED BENIGN MAMMARY DYSPLASIA OF RIGHT BREAST: ICD-10-CM

## 2025-09-09 DIAGNOSIS — Z80.3 FAMILY HISTORY OF MALIGNANT NEOPLASM OF BREAST: ICD-10-CM

## 2025-09-09 DIAGNOSIS — R92.30 DENSE BREASTS, UNSPECIFIED: ICD-10-CM

## 2025-09-09 PROCEDURE — 99213 OFFICE O/P EST LOW 20 MIN: CPT

## 2025-09-09 RX ORDER — CYCLOSPORINE 0.5 MG/ML
0.05 EMULSION OPHTHALMIC
Refills: 0 | Status: ACTIVE | COMMUNITY

## 2025-09-10 ENCOUNTER — RESULT REVIEW (OUTPATIENT)
Age: 60
End: 2025-09-10

## 2025-09-16 ENCOUNTER — NON-APPOINTMENT (OUTPATIENT)
Age: 60
End: 2025-09-16

## 2025-09-17 ENCOUNTER — APPOINTMENT (OUTPATIENT)
Dept: PAIN MANAGEMENT | Facility: CLINIC | Age: 60
End: 2025-09-17
Payer: COMMERCIAL

## 2025-09-17 VITALS
BODY MASS INDEX: 27.6 KG/M2 | HEIGHT: 62 IN | SYSTOLIC BLOOD PRESSURE: 140 MMHG | DIASTOLIC BLOOD PRESSURE: 60 MMHG | OXYGEN SATURATION: 98 % | WEIGHT: 150 LBS | HEART RATE: 68 BPM

## 2025-09-17 DIAGNOSIS — M54.12 RADICULOPATHY, CERVICAL REGION: ICD-10-CM

## 2025-09-17 PROCEDURE — 99213 OFFICE O/P EST LOW 20 MIN: CPT

## 2025-09-17 RX ORDER — BACLOFEN 5 MG/1
5 TABLET ORAL
Qty: 25 | Refills: 2 | Status: ACTIVE | COMMUNITY
Start: 2025-09-17 | End: 1900-01-01